# Patient Record
Sex: MALE | Race: WHITE | NOT HISPANIC OR LATINO | Employment: FULL TIME | URBAN - NONMETROPOLITAN AREA
[De-identification: names, ages, dates, MRNs, and addresses within clinical notes are randomized per-mention and may not be internally consistent; named-entity substitution may affect disease eponyms.]

---

## 2018-06-26 ENCOUNTER — TRANSCRIBE ORDERS (OUTPATIENT)
Dept: ADMINISTRATIVE | Facility: HOSPITAL | Age: 38
End: 2018-06-26

## 2018-06-26 ENCOUNTER — APPOINTMENT (OUTPATIENT)
Dept: LAB | Facility: HOSPITAL | Age: 38
End: 2018-06-26

## 2018-06-26 DIAGNOSIS — IMO0002 TYPE II DIABETES MELLITUS WITH COMA, UNCONTROLLED: Primary | ICD-10-CM

## 2018-06-26 LAB — HBA1C MFR BLD: 6.5 % (ref 3–6)

## 2018-06-26 PROCEDURE — 83036 HEMOGLOBIN GLYCOSYLATED A1C: CPT | Performed by: INTERNAL MEDICINE

## 2018-06-26 PROCEDURE — 36415 COLL VENOUS BLD VENIPUNCTURE: CPT | Performed by: INTERNAL MEDICINE

## 2018-11-16 ENCOUNTER — TRANSCRIBE ORDERS (OUTPATIENT)
Dept: ADMINISTRATIVE | Facility: HOSPITAL | Age: 38
End: 2018-11-16

## 2018-11-16 ENCOUNTER — LAB (OUTPATIENT)
Dept: LAB | Facility: HOSPITAL | Age: 38
End: 2018-11-16

## 2018-11-16 DIAGNOSIS — E11.8 TYPE 2 DIABETES MELLITUS WITH COMPLICATION, UNSPECIFIED WHETHER LONG TERM INSULIN USE: Primary | ICD-10-CM

## 2018-11-16 DIAGNOSIS — E11.8 TYPE 2 DIABETES MELLITUS WITH COMPLICATION, UNSPECIFIED WHETHER LONG TERM INSULIN USE: ICD-10-CM

## 2018-11-16 LAB
ANION GAP SERPL CALCULATED.3IONS-SCNC: 12.6 MMOL/L (ref 10–20)
BUN BLD-MCNC: 20 MG/DL (ref 7–20)
BUN/CREAT SERPL: 22.2 (ref 6.3–21.9)
CALCIUM SPEC-SCNC: 9.7 MG/DL (ref 8.4–10.2)
CHLORIDE SERPL-SCNC: 100 MMOL/L (ref 98–107)
CO2 SERPL-SCNC: 32 MMOL/L (ref 26–30)
CREAT BLD-MCNC: 0.9 MG/DL (ref 0.6–1.3)
GFR SERPL CREATININE-BSD FRML MDRD: 94 ML/MIN/1.73
GLUCOSE BLD-MCNC: 167 MG/DL (ref 74–98)
HBA1C MFR BLD: 6.2 % (ref 3–6)
POTASSIUM BLD-SCNC: 4.6 MMOL/L (ref 3.5–5.1)
SODIUM BLD-SCNC: 140 MMOL/L (ref 137–145)

## 2018-11-16 PROCEDURE — 83036 HEMOGLOBIN GLYCOSYLATED A1C: CPT

## 2018-11-16 PROCEDURE — 80048 BASIC METABOLIC PNL TOTAL CA: CPT

## 2018-11-16 PROCEDURE — 36415 COLL VENOUS BLD VENIPUNCTURE: CPT

## 2019-02-05 ENCOUNTER — OFFICE VISIT (OUTPATIENT)
Dept: UROLOGY | Facility: CLINIC | Age: 39
End: 2019-02-05

## 2019-02-05 VITALS
WEIGHT: 215 LBS | HEART RATE: 91 BPM | DIASTOLIC BLOOD PRESSURE: 80 MMHG | SYSTOLIC BLOOD PRESSURE: 100 MMHG | OXYGEN SATURATION: 97 % | BODY MASS INDEX: 28.49 KG/M2 | HEIGHT: 73 IN | TEMPERATURE: 97.5 F

## 2019-02-05 DIAGNOSIS — N52.9 ERECTILE DYSFUNCTION, UNSPECIFIED ERECTILE DYSFUNCTION TYPE: Primary | ICD-10-CM

## 2019-02-05 DIAGNOSIS — I10 HYPERTENSION, UNSPECIFIED TYPE: ICD-10-CM

## 2019-02-05 DIAGNOSIS — E11.8 TYPE 2 DIABETES MELLITUS WITH COMPLICATION, UNSPECIFIED WHETHER LONG TERM INSULIN USE: ICD-10-CM

## 2019-02-05 PROCEDURE — 99244 OFF/OP CNSLTJ NEW/EST MOD 40: CPT | Performed by: UROLOGY

## 2019-02-05 RX ORDER — LISINOPRIL 5 MG/1
5 TABLET ORAL DAILY
COMMUNITY
Start: 2019-01-08 | End: 2020-11-11 | Stop reason: SDUPTHER

## 2019-02-05 RX ORDER — FLUOXETINE HYDROCHLORIDE 40 MG/1
40 CAPSULE ORAL DAILY
COMMUNITY
Start: 2019-01-08

## 2019-02-05 RX ORDER — DULAGLUTIDE 1.5 MG/.5ML
INJECTION, SOLUTION SUBCUTANEOUS
COMMUNITY
Start: 2018-11-01 | End: 2019-12-13

## 2019-02-05 NOTE — PROGRESS NOTES
Chief Complaint  Erectile dysfunction    Referring Provider  Odalis Landin APRN HPI  Mr. Flores is a 38 y.o. male with history of T2DM, HTN who presents with erectile dysfunction. He used to weigh > 350 lbs and now weights 215.     He primarily has difficulty with achieving and maintaining an erection.    He has tried cialis in the past with unclear results.    Without medication, he rates his erectile rigidity as a 5 on a scale of 0-10 (with 5 being just firm enough for penetration).   SRINIVAS - 6    He thins he did take the oral PDE5- on an empty stomach, 1 hour prior to intercourse and without alcohol. He does not take oral nitrates for chest pain.     He denies any issues with energy or libido.   He denies anxiety about erections.    His diabetes is managed well with , metformin, Januvia, and trulicity  His hypertension is well managed with lisinopril      Past Medical History  Past Medical History:   Diagnosis Date   • Acid reflux    • Diabetes (CMS/HCC)    • HTN (hypertension)        Past Surgical History   has no past surgical history on file.    Medications  He has a current medication list which includes the following prescription(s): fluoxetine, lisinopril, metformin, sitagliptin, and trulicity.    Allergies  No Known Allergies    Social History  He  reports that  has never smoked. he has never used smokeless tobacco. He reports that he does not drink alcohol or use drugs.    Family History  He has no family history of prostate cancer.  There is a strong family history of cardiovascular disease.  He family history includes Heart disease in his maternal grandfather and paternal grandfather.    Review of Systems  A complete review of systems was performed and was unremarkable.  All other systems were otherwise negative.     Physical Exam  There were no vitals taken for this visit.  There is no height or weight on file to calculate BMI.  Constitutional: NAD, WDWN.   HEENT: NCAT. Conjunctivae normal.   MMM.    Cardiovascular: Regular rate.  Pulmonary/Chest: Respirations are even and non-labored bilaterally.  Abdominal: Soft. No distension, tenderness, masses or guarding. No CVA tenderness.  Neurological: A + O x 3.  Cranial Nerves II-XII grossly intact. Normal gait.  Extremities: DWAIN x 4, Warm. No clubbing.  No cyanosis.    Skin: Pink, warm and dry.  No rashes noted.    Genitourinary  Penis: circumcised penis, glans normal, no penile discharge.  No rashes/lesions.    Testes: Left: 30mL, Right: 30mL, no masses and normal consistency, nontender to palpation. Vasa palpable bilaterally.  No clinically palpable varicocele with valsalva. Remainder of scrotal contents normal    Labs  No results found for: TESTOSTERONE, PROLACTIN, FSH, LH, HCT     12/17/18  Total  T - 527  Free T -42    No results found for: PSA     Lab Results   Component Value Date    HGBA1C 6.2 (H) 11/16/2018       Assessment  Mr. Flores is a 38 y.o. male with erectile dysfunction.  Risk factors include DM, HTN.     Plan  1.  I have recommended to the patient he continue current healthy lifestyle patterns   2.  I discussed treatment options including oral PDE5- medication, intra-urethral suppositories, pharmacologic injections, vacuum erection devices and implantable penile prostheses.  He would like to try ICI. We will order him Trimix from the compounding pharmacy in [place] and he will return in 2 weeks for intracavernosal injection teaching.   3. He was instructed to go immediately to his nearest emergency room in the event he developed an erection lasting longer than 3 hours. He voiced understanding of all these instructions and the importance of seeking prompt medical attention for an erection lasting longer than 3 hours.   4. We also discussed association of erectile dysfunction and future coronary events. Erectile dysfunction can be a marker for future coronary events and usually precedes by 2-4 years.  Recommended he discuss EKG and  possible stress test with his PCP and following his cholesterol.

## 2019-02-25 ENCOUNTER — OFFICE VISIT (OUTPATIENT)
Dept: UROLOGY | Facility: CLINIC | Age: 39
End: 2019-02-25

## 2019-02-25 VITALS — HEIGHT: 73 IN | RESPIRATION RATE: 16 BRPM | BODY MASS INDEX: 28.49 KG/M2 | WEIGHT: 215 LBS

## 2019-02-25 DIAGNOSIS — N52.1 ERECTILE DYSFUNCTION DUE TO DISEASES CLASSIFIED ELSEWHERE: Primary | ICD-10-CM

## 2019-02-25 DIAGNOSIS — E11.8 TYPE 2 DIABETES MELLITUS WITH COMPLICATION, UNSPECIFIED WHETHER LONG TERM INSULIN USE: ICD-10-CM

## 2019-02-25 DIAGNOSIS — I10 HYPERTENSION, UNSPECIFIED TYPE: ICD-10-CM

## 2019-02-25 PROCEDURE — 99214 OFFICE O/P EST MOD 30 MIN: CPT | Performed by: UROLOGY

## 2019-02-25 NOTE — PROGRESS NOTES
"Chief Complaint  Erectile dysfunction    HPI  Mr. Flores is a 38 y.o. male with history of T2DM, HTN who presents with erectile dysfunction. He used to weigh > 350 lbs and now weights 215.     He presents today for intracavernosal injections and teaching.     To review,  He primarily has difficulty with achieving and maintaining an erection.  He has tried cialis in the past with unclear results.    Without medication, he rates his erectile rigidity as a 5 on a scale of 0-10 (with 5 being just firm enough for penetration).   SRINIVAS - 6  He thinks he did take the oral PDE5- on an empty stomach, 1 hour prior to intercourse and without alcohol. He does not take oral nitrates for chest pain.     He denies any issues with energy or libido.   He denies anxiety about erections.    His diabetes is managed well with , metformin, Januvia, and trulicity  His hypertension is well managed with lisinopril      Past Medical History  Past Medical History:   Diagnosis Date   • Acid reflux    • Diabetes (CMS/[facility])    • HTN (hypertension)        Past Surgical History   has no past surgical history on file.    Medications  He has a current medication list which includes the following prescription(s): fluoxetine, lisinopril, metformin, sitagliptin, and trulicity.    Allergies  No Known Allergies    Social History  He  reports that  has never smoked. he has never used smokeless tobacco. He reports that he does not drink alcohol or use drugs.    Family History  He has no family history of prostate cancer.  There is a strong family history of cardiovascular disease.  He family history includes Heart disease in his maternal grandfather and paternal grandfather.    Review of Systems  A complete review of systems was performed and was unremarkable.  All other systems were otherwise negative.     Physical Exam  Resp 16   Ht 185.4 cm (72.99\")   Wt 97.5 kg (215 lb)   BMI 28.37 kg/m²   Body mass index is 28.37 kg/m².  Constitutional: NAD, WDWN. "   HEENT: NCAT. Conjunctivae normal.  MMM.    Cardiovascular: Regular rate.  Pulmonary/Chest: Respirations are even and non-labored bilaterally.  Abdominal: Soft. No distension, tenderness, masses or guarding. No CVA tenderness.  Neurological: A + O x 3.  Cranial Nerves II-XII grossly intact. Normal gait.  Extremities: DWAIN x 4, Warm. No clubbing.  No cyanosis.    Skin: Pink, warm and dry.  No rashes noted.    Genitourinary  Penis: circumcised penis, glans normal, no penile discharge.  No rashes/lesions.    Testes: Left: 30mL, Right: 30mL, no masses and normal consistency, nontender to palpation. Vasa palpable bilaterally.  No clinically palpable varicocele with valsalva. Remainder of scrotal contents normal    Labs  No results found for: TESTOSTERONE, PROLACTIN, FSH, LH, HCT     12/17/18  Total  T - 527  Free T -42    No results found for: PSA     Lab Results   Component Value Date    HGBA1C 6.2 (H) 11/16/2018       Assessment  Mr. Flores is a 38 y.o. male with erectile dysfunction.  Risk factors include DM, HTN.     Today I titrated him up to 0.3 mL of Trimix.  He experienced only a 50% erection with this.  He seemed very nervous in the office and uncomfortable with the whole visit.  Therefore I told him to try injecting starting with 0.2 mL at home and we will see what his results are with this.  He did have an approximate 30 degree dorsolateral curvature to the right approximately 1-2 cm from the coronal sulcus.     I counseled him that he may be looking at needing a penile prosthesis in the future.  He would be a good candidate for this.    Plan  1.   He was instructed to go immediately to his nearest emergency room in the event he developed an erection lasting longer than 3 hours. He voiced understanding of all these instructions and the importance of seeking prompt medical attention for an erection lasting longer than 3 hours.   2.   Follow-up in 2 weeks      I spent a total of 30 minutes with the patient in  face-to-face interaction, with >50% of the time spent in engaging in counseling on the risks, benefits, and alternatives of the therapy and coordinating care.

## 2019-03-11 ENCOUNTER — OFFICE VISIT (OUTPATIENT)
Dept: UROLOGY | Facility: CLINIC | Age: 39
End: 2019-03-11

## 2019-03-11 VITALS — HEIGHT: 73 IN | WEIGHT: 215 LBS | RESPIRATION RATE: 16 BRPM | BODY MASS INDEX: 28.49 KG/M2

## 2019-03-11 DIAGNOSIS — N52.1 ERECTILE DYSFUNCTION DUE TO DISEASES CLASSIFIED ELSEWHERE: Primary | ICD-10-CM

## 2019-03-11 PROCEDURE — 99213 OFFICE O/P EST LOW 20 MIN: CPT | Performed by: UROLOGY

## 2019-03-11 NOTE — PROGRESS NOTES
Chief Complaint  Erectile dysfunction    HPI  Mr. Flores is a 38 y.o. male with history of T2DM, HTN who presents with erectile dysfunction. He used to weigh > 350 lbs and now weights 215.     He presents today for follow-up after his initial experience at home with intracavernosal injections.  He states that he has had excellent erections for penetration with 0.2 mL of the Trimix.  His erections did not last longer than an hour.    To review,  He primarily has difficulty with achieving and maintaining an erection.  He has tried cialis in the past with unclear results.    Without medication, he rates his erectile rigidity as a 5 on a scale of 0-10 (with 5 being just firm enough for penetration).   SRINIVAS - 6  He thinks he did take the oral PDE5- on an empty stomach, 1 hour prior to intercourse and without alcohol. He does not take oral nitrates for chest pain.     He denies any issues with energy or libido.   He denies anxiety about erections.    His diabetes is managed well with , metformin, Januvia, and trulicity  His hypertension is well managed with lisinopril      Past Medical History  Past Medical History:   Diagnosis Date   • Acid reflux    • Diabetes (CMS/[facility])    • HTN (hypertension)        Past Surgical History   has no past surgical history on file.    Medications  He has a current medication list which includes the following prescription(s): fluoxetine, lisinopril, metformin, sitagliptin, and trulicity.    Allergies  No Known Allergies    Social History  He  reports that  has never smoked. he has never used smokeless tobacco. He reports that he does not drink alcohol or use drugs.    Family History  He has no family history of prostate cancer.  There is a strong family history of cardiovascular disease.  He family history includes Heart disease in his maternal grandfather and paternal grandfather.    Review of Systems  A complete review of systems was performed and was unremarkable.  All other systems  "were otherwise negative.     Physical Exam  Resp 16   Ht 185.4 cm (72.99\")   Wt 97.5 kg (215 lb)   BMI 28.37 kg/m²   Body mass index is 28.37 kg/m².  Constitutional: NAD, WDWN.   HEENT: NCAT. Conjunctivae normal.  MMM.    Cardiovascular: Regular rate.  Pulmonary/Chest: Respirations are even and non-labored bilaterally.  Abdominal: Soft. No distension, tenderness, masses or guarding. No CVA tenderness.  Neurological: A + O x 3.  Cranial Nerves II-XII grossly intact. Normal gait.  Extremities: DWAIN x 4, Warm. No clubbing.  No cyanosis.    Skin: Pink, warm and dry.  No rashes noted.      Labs  No results found for: TESTOSTERONE, PROLACTIN, FSH, LH, HCT     12/17/18  Total  T - 527  Free T -42    No results found for: PSA     Lab Results   Component Value Date    HGBA1C 6.2 (H) 11/16/2018       Assessment  Mr. Flores is a 38 y.o. male with erectile dysfunction, who is currently using 0.2 mL of Trimix with excellent results.  Risk factors include DM, HTN.     I once again counseled him on calling our office or come into the ER for an erection lasting longer than 2 hours.  I counseled him that he may be looking at needing a penile prosthesis in the future.  He would be a good candidate for this.    Plan  1.   FU in 6 mo        "

## 2019-10-21 ENCOUNTER — TELEPHONE (OUTPATIENT)
Dept: INTERNAL MEDICINE | Facility: CLINIC | Age: 39
End: 2019-10-21

## 2019-12-13 ENCOUNTER — OFFICE VISIT (OUTPATIENT)
Dept: ENDOCRINOLOGY | Facility: CLINIC | Age: 39
End: 2019-12-13

## 2019-12-13 VITALS
SYSTOLIC BLOOD PRESSURE: 98 MMHG | HEIGHT: 73 IN | HEART RATE: 82 BPM | WEIGHT: 223 LBS | BODY MASS INDEX: 29.55 KG/M2 | OXYGEN SATURATION: 98 % | DIASTOLIC BLOOD PRESSURE: 70 MMHG

## 2019-12-13 DIAGNOSIS — E11.65 UNCONTROLLED TYPE 2 DIABETES MELLITUS WITH HYPERGLYCEMIA (HCC): Primary | ICD-10-CM

## 2019-12-13 PROBLEM — R73.09 ABNORMAL GLUCOSE: Status: ACTIVE | Noted: 2019-12-13

## 2019-12-13 LAB
GLUCOSE BLDC GLUCOMTR-MCNC: 442 MG/DL (ref 70–130)
HBA1C MFR BLD: 8.5 %

## 2019-12-13 PROCEDURE — 83036 HEMOGLOBIN GLYCOSYLATED A1C: CPT | Performed by: PHYSICIAN ASSISTANT

## 2019-12-13 PROCEDURE — 99215 OFFICE O/P EST HI 40 MIN: CPT | Performed by: PHYSICIAN ASSISTANT

## 2019-12-13 PROCEDURE — 82947 ASSAY GLUCOSE BLOOD QUANT: CPT | Performed by: PHYSICIAN ASSISTANT

## 2019-12-13 RX ORDER — SIMVASTATIN 40 MG
40 TABLET ORAL NIGHTLY
COMMUNITY
Start: 2019-11-05 | End: 2021-02-24 | Stop reason: SDUPTHER

## 2019-12-13 RX ORDER — OMEPRAZOLE 20 MG/1
20 CAPSULE, DELAYED RELEASE ORAL DAILY
COMMUNITY
Start: 2019-11-05

## 2019-12-13 RX ORDER — LINAGLIPTIN 5 MG/1
TABLET, FILM COATED ORAL
COMMUNITY
Start: 2019-11-05 | End: 2019-12-13 | Stop reason: ALTCHOICE

## 2019-12-13 NOTE — PATIENT INSTRUCTIONS
Stop Tradjenta  Start weekly injection Trulicity 0.75mg weekly.  Start Farxiga 10mg daily.   Continue metformin 1000mg twice a day.

## 2019-12-13 NOTE — PROGRESS NOTES
"Chief Complaint  Establish care for Diabetes Mellitus.     HPI   Flaco Flores is a 39 y.o. male who is here today for evaluation of Diabetes Mellitus type 2. The initial diagnosis of diabetes was made 2015.     during visit. Ate a bowl of cereal last night. Fasting right now.  Works swing shift. Needs to get A1C <8 for work.     A1C- 8.5 (12/13/19)  Labs reviewed:  10/17/19- AC ratio 4, , LDL 92, , TSH 2.68    Diabetic complications: mild peripheral neuropathy  Eye exam current (within one year): due  Foot care and dental care: discussed    Current diabetic medications include:  Metformin 1000mg BID  Tradjenta 5mg daily    Statin: zocor 40    Past medications: Januvia (insurance did not cover)    Diabetic Monitoring  - no meter or log for review. His blood sugar is checked at work, he reports readings 170-mid 200s    Nutrition:     Current diet: in general, an \"unhealthy\" diet, on average, 2-3 meals per day, no sugared drinks  Current exercise: none    The following portions of the patient's history were reviewed and updated by me as appropriate: allergies, current medications, past family history, past social history, past surgical history and problem list.    Past Medical History:   Diagnosis Date   • Acid reflux    • Diabetes (CMS/HCC)    • HTN (hypertension)        Medications    Current Outpatient Medications:   •  FLUoxetine (PROzac) 40 MG capsule, , Disp: , Rfl:   •  lisinopril (PRINIVIL,ZESTRIL) 5 MG tablet, , Disp: , Rfl:   •  metFORMIN (GLUCOPHAGE) 1000 MG tablet, Take 1 tablet by mouth 2 (Two) Times a Day., Disp: 60 tablet, Rfl: 6  •  omeprazole (priLOSEC) 20 MG capsule, , Disp: , Rfl:   •  simvastatin (ZOCOR) 40 MG tablet, , Disp: , Rfl:   •  Dapagliflozin Propanediol (FARXIGA) 10 MG tablet, Take 10 mg by mouth Daily., Disp: 30 tablet, Rfl: 6  •  Dulaglutide (TRULICITY) 0.75 MG/0.5ML solution pen-injector, Inject 0.75 mg under the skin into the appropriate area as directed 1 " "(One) Time Per Week., Disp: 4 pen, Rfl: 6    Review of Systems  Review of Systems   Constitutional: Negative for fatigue.   Cardiovascular: Negative for chest pain.   Endocrine: Negative for polydipsia, polyphagia and polyuria.   Skin: Negative for pallor.   Neurological: Positive for dizziness and headaches. Negative for tremors, seizures, speech difficulty and weakness.   Psychiatric/Behavioral: Negative for confusion. The patient is not nervous/anxious.         Physical Exam    BP 98/70   Pulse 82   Ht 185.4 cm (72.99\")   Wt 101 kg (223 lb)   SpO2 98%   BMI 29.43 kg/m² Body mass index is 29.43 kg/m².  Physical Exam   Constitutional: He is oriented to person, place, and time. He appears well-developed. No distress.   HENT:   Head: Normocephalic.   Right Ear: External ear normal.   Left Ear: External ear normal.   Nose: Nose normal.   Eyes: Conjunctivae are normal. Right eye exhibits no discharge. Left eye exhibits no discharge. No scleral icterus.   Neck: Neck supple. No JVD present. No tracheal deviation present. No thyromegaly present.   Cardiovascular: Normal rate, regular rhythm, normal heart sounds and intact distal pulses.   No murmur heard.  Pulmonary/Chest: Effort normal and breath sounds normal. No respiratory distress. He has no wheezes.   Abdominal: Soft. Bowel sounds are normal. There is no tenderness.   Musculoskeletal: He exhibits no edema or tenderness.    Flaco had a diabetic foot exam performed today.   During the foot exam he had a monofilament test performed.    Neurological Sensory Findings -  Altered sharp/dull right ankle/foot discrimination and altered sharp/dull left ankle/foot discrimination.  Vascular Status -  His right foot exhibits normal foot vasculature  and no edema. His left foot exhibits normal foot vasculature  and no edema.  Skin Integrity  -  His right foot skin is intact.  He has no right foot onychomycosis, no right foot ulcer and non-callous right foot.His left foot " skin is intact. He has no left foot onychomycosis, no left foot ulcer and non-callous left foot..  Neurological: He is alert and oriented to person, place, and time.   Skin: Skin is warm and dry. No rash noted. He is not diaphoretic. No erythema.   Psychiatric: He has a normal mood and affect. His behavior is normal. Judgment and thought content normal.       Labs and Imaging   Lab Results   Component Value Date    HGBA1C 8.5 12/13/2019    HGBA1C 6.2 (H) 11/16/2018    HGBA1C 6.5 (H) 06/26/2018     Office Visit on 12/13/2019   Component Date Value Ref Range Status   • Glucose 12/13/2019 442* 70 - 130 mg/dL Final   • Hemoglobin A1C 12/13/2019 8.5  % Final       Assessment / Plan   Flaco was seen today for diabetes.    Diagnoses and all orders for this visit:    Uncontrolled type 2 diabetes mellitus with hyperglycemia (CMS/[facility])  -     POC Glucose Fingerstick  -     POC Glycosylated Hemoglobin (Hb A1C)  -     Dulaglutide (TRULICITY) 0.75 MG/0.5ML solution pen-injector; Inject 0.75 mg under the skin into the appropriate area as directed 1 (One) Time Per Week.  -     Dapagliflozin Propanediol (FARXIGA) 10 MG tablet; Take 10 mg by mouth Daily.  -     metFORMIN (GLUCOPHAGE) 1000 MG tablet; Take 1 tablet by mouth 2 (Two) Times a Day.        Diabetes Mellitus 2 is under poor control.  -with peripheral neuropathy  -A1c 8.5, goal <7  -needs to get A1C <8 for his job  -change tradjenta to trulicity 0.75mg sc weekly. Samples and copay card provided. administration reviewed. No hx of pancreatitis. No personal or fam hx of thyroid ca or MEN2.  -start farxiga 10mg daily. 30-day-free coupon and copay card provided. Discussed importance of adequate hydration and good hygiene with this medication.   -continue metformin 1000mg BID  -check sugar fasting and hs, bring meter/log to all visits      1.  Diet: 3-4 carb servings per meal for females, 4-5 carb servings per meal for males  Spread carb intake throughout the day  Increase lean  protein and vegetable intake  Avoid sugary drinks and processed carbs including crackers, cookies, cakes  2.  Exercise: Recommend at least 30 minutes of exercise daily, at least 5 days per week. Increase exercise gradually.   3.  Blood Glucose Goal: Blood glucose goal <150 fasting, <180 2 hr postprandial  4.  Microalbumin due 10/2020  5.  Education performed during this visit: long term diabetic complications discussed. , annual eye examinations at Ophthalmology discussed, dental hygiene discussed  and foot care reviewed., home glucose monitoring emphasized, all medications, side effects and compliance discussed carefully and Hypoglycemia management and prevention reviewed. Reviewed ‘ABCs’ of diabetes management (respective goals in parentheses):  A1C (<7), blood pressure (<130/80), and cholesterol (LDL <100, if CVD <70).    Patient Instructions   Stop Tradjenta  Start weekly injection Trulicity 0.75mg weekly.  Start Farxiga 10mg daily.   Continue metformin 1000mg twice a day.       Follow up: Return in about 3 months (around 3/13/2020).    Discussed the nature of the disease including, risks, complications, implications, management, safe and proper use of medications. Encouraged therapeutic lifestyle changes including low calorie diet with plenty of fruits and vegetables, daily exercise, medication compliance, and keeping scheduled follow up appointments with me and any other providers. Encouraged patient to have appointment for complete physical, fasting labs, appropriate screenings, and immunizations on an annual basis.    25 min  of 45 min face-to-face visit time spent for coordination of care and counselling regarding identified problems as outlined in the objective, assessment and discussion portions of the documentation.    Signed: Myron Bourne PA-C

## 2019-12-17 ENCOUNTER — TELEPHONE (OUTPATIENT)
Dept: INTERNAL MEDICINE | Facility: CLINIC | Age: 39
End: 2019-12-17

## 2019-12-17 NOTE — TELEPHONE ENCOUNTER
FARXIGA IS NOT COVERED BY PATIENTS INSURANCE BUT INVOKANA AND JARDIANCE IS. PLEASE CONTACT HIS WIFE -[phone]

## 2020-05-08 ENCOUNTER — OFFICE VISIT (OUTPATIENT)
Dept: ENDOCRINOLOGY | Facility: CLINIC | Age: 40
End: 2020-05-08

## 2020-05-08 VITALS
WEIGHT: 218.6 LBS | SYSTOLIC BLOOD PRESSURE: 112 MMHG | OXYGEN SATURATION: 98 % | BODY MASS INDEX: 28.97 KG/M2 | HEART RATE: 93 BPM | HEIGHT: 73 IN | DIASTOLIC BLOOD PRESSURE: 60 MMHG

## 2020-05-08 DIAGNOSIS — I10 ESSENTIAL HYPERTENSION: ICD-10-CM

## 2020-05-08 DIAGNOSIS — E11.65 UNCONTROLLED TYPE 2 DIABETES MELLITUS WITH HYPERGLYCEMIA (HCC): Primary | ICD-10-CM

## 2020-05-08 DIAGNOSIS — E01.0 THYROMEGALY: ICD-10-CM

## 2020-05-08 LAB
GLUCOSE BLDC GLUCOMTR-MCNC: 149 MG/DL (ref 70–130)
HBA1C MFR BLD: 7.8 %

## 2020-05-08 PROCEDURE — 82947 ASSAY GLUCOSE BLOOD QUANT: CPT | Performed by: PHYSICIAN ASSISTANT

## 2020-05-08 PROCEDURE — 99214 OFFICE O/P EST MOD 30 MIN: CPT | Performed by: PHYSICIAN ASSISTANT

## 2020-05-08 PROCEDURE — 83036 HEMOGLOBIN GLYCOSYLATED A1C: CPT | Performed by: PHYSICIAN ASSISTANT

## 2020-05-08 RX ORDER — GLIMEPIRIDE 4 MG/1
TABLET ORAL
Qty: 30 TABLET | Refills: 6 | Status: SHIPPED | OUTPATIENT
Start: 2020-05-08 | End: 2020-08-11 | Stop reason: SDUPTHER

## 2020-05-08 NOTE — PROGRESS NOTES
"Chief Complaint  F/u for Diabetes Mellitus.     HPI   Flaco Flores is a 39 y.o. male who is here today for f/u of Diabetes Mellitus type 2. The initial diagnosis of diabetes was made 2015.    Admits to poor diet.     A1C- 7.8 (5/8/2020), 8.5 (12/13/19)    Labs reviewed:  10/17/19- AC ratio 4, , LDL 92, , TSH 2.68    Diabetic complications: mild peripheral neuropathy  Eye exam current (within one year): due  Foot care and dental care: discussed    Current diabetic medications include:  Metformin 1000mg BID  Jardiance 25mg daily  Trulicity 0.75mg sc weekly - mild nausea, but tolerable     Statin: zocor 40    Past medications: Januvia (insurance did not cover), tradjenta    Diabetic Monitoring  - no meter or log for review. His blood sugar is checked at work, he reports readings 170-mid 200s    Nutrition:     Current diet: in general, an \"unhealthy\" diet, on average, 2-3 meals per day, no sugared drinks  Current exercise: none    The following portions of the patient's history were reviewed and updated by me as appropriate: allergies, current medications, past family history, past social history, past surgical history and problem list.      Past Medical History:   Diagnosis Date   • Acid reflux    • Diabetes (CMS/HCC)    • HTN (hypertension)        Medications    Current Outpatient Medications:   •  Dulaglutide (Trulicity) 0.75 MG/0.5ML solution pen-injector, Inject 0.75 mg under the skin into the appropriate area as directed 1 (One) Time Per Week., Disp: 4 pen, Rfl: 6  •  Empagliflozin (Jardiance) 25 MG tablet, Take 25 mg by mouth Daily., Disp: 30 tablet, Rfl: 6  •  FLUoxetine (PROzac) 40 MG capsule, , Disp: , Rfl:   •  lisinopril (PRINIVIL,ZESTRIL) 5 MG tablet, , Disp: , Rfl:   •  metFORMIN (GLUCOPHAGE) 1000 MG tablet, Take 1 tablet by mouth 2 (Two) Times a Day., Disp: 60 tablet, Rfl: 6  •  omeprazole (priLOSEC) 20 MG capsule, , Disp: , Rfl:   •  simvastatin (ZOCOR) 40 MG tablet, , Disp: , Rfl: " "  •  glimepiride (Amaryl) 4 MG tablet, Take 4mg before biggest meal daily, Disp: 30 tablet, Rfl: 6    Review of Systems  Review of Systems   Constitutional: Negative for fatigue.   Cardiovascular: Negative for chest pain.   Endocrine: Negative for polydipsia, polyphagia and polyuria.   Skin: Negative for pallor.   Neurological: Positive for dizziness and headaches. Negative for tremors, seizures, speech difficulty and weakness.   Psychiatric/Behavioral: Negative for confusion. The patient is not nervous/anxious.         Physical Exam    /60 (BP Location: Left arm, Patient Position: Sitting, Cuff Size: Adult)   Pulse 93   Ht 185.4 cm (73\")   Wt 99.2 kg (218 lb 9.6 oz)   SpO2 98%   BMI 28.84 kg/m² Body mass index is 28.84 kg/m².  Physical Exam   Constitutional: He is oriented to person, place, and time. He appears well-developed. No distress.   HENT:   Head: Normocephalic.   Right Ear: External ear normal.   Left Ear: External ear normal.   Nose: Nose normal.   Eyes: Conjunctivae are normal. Right eye exhibits no discharge. Left eye exhibits no discharge. No scleral icterus.   Neck: Neck supple. No JVD present. No tracheal deviation present. Thyromegaly present.   Cardiovascular: Normal rate, regular rhythm, normal heart sounds and intact distal pulses.   No murmur heard.  Pulmonary/Chest: Effort normal and breath sounds normal. No respiratory distress. He has no wheezes.   Abdominal: Soft. Bowel sounds are normal. There is no tenderness.   Musculoskeletal: He exhibits no edema or tenderness.   Neurological: He is alert and oriented to person, place, and time.   Skin: Skin is warm and dry. No rash noted. He is not diaphoretic. No erythema.   Psychiatric: He has a normal mood and affect. His behavior is normal. Judgment and thought content normal.       Labs and Imaging   Lab Results   Component Value Date    HGBA1C 7.8 05/08/2020    HGBA1C 8.5 12/13/2019    HGBA1C 6.2 (H) 11/16/2018     Office Visit on " 05/08/2020   Component Date Value Ref Range Status   • Glucose 05/08/2020 149* 70 - 130 mg/dL Final   • Hemoglobin A1C 05/08/2020 7.8  % Final       Assessment / Plan   Flaco was seen today for diabetes.    Diagnoses and all orders for this visit:    Uncontrolled type 2 diabetes mellitus with hyperglycemia (CMS/[facility])  -     POC Glucose  -     POC Glycosylated Hemoglobin (Hb A1C)  -     glimepiride (Amaryl) 4 MG tablet; Take 4mg before biggest meal daily  -     metFORMIN (GLUCOPHAGE) 1000 MG tablet; Take 1 tablet by mouth 2 (Two) Times a Day.  -     Empagliflozin (Jardiance) 25 MG tablet; Take 25 mg by mouth Daily.  -     Dulaglutide (Trulicity) 0.75 MG/0.5ML solution pen-injector; Inject 0.75 mg under the skin into the appropriate area as directed 1 (One) Time Per Week.    Thyromegaly  -     US Thyroid; Future    Essential hypertension        Diabetes Mellitus 2 is under poor control.  -with peripheral neuropathy  -A1c 7.8, down from 8.5 12/2019, goal <7  -needs to get A1C <8 for his job  -add glimepiride 4mg qd ac. Emphasis on taking AC to avoid hypoglycemia  -trulicity 0.75mg sc weekly  -continue Jardiance 25mg QD  -continue metformin 1000mg BID  -check sugar fasting and hs, bring meter/log to all visits      1.  Diet: 3-4 carb servings per meal for females, 4-5 carb servings per meal for males  Spread carb intake throughout the day  Increase lean protein and vegetable intake  Avoid sugary drinks and processed carbs including crackers, cookies, cakes  2.  Exercise: Recommend at least 30 minutes of exercise daily, at least 5 days per week. Increase exercise gradually.   3.  Blood Glucose Goal: Blood glucose goal <150 fasting, <180 2 hr postprandial  4.  Microalbumin due 10/2020  5.  Education performed during this visit: long term diabetic complications discussed. , annual eye examinations at Ophthalmology discussed, dental hygiene discussed  and foot care reviewed., home glucose monitoring emphasized, all  medications, side effects and compliance discussed carefully and Hypoglycemia management and prevention reviewed. Reviewed ‘ABCs’ of diabetes management (respective goals in parentheses):  A1C (<7), blood pressure (<130/80), and cholesterol (LDL <100, if CVD <70).    Thyromegaly   -thyroid u/s    HTN  -controlled  -continue lisinopril    There are no Patient Instructions on file for this visit.    Follow up: Return in about 3 months (around 8/8/2020).    Discussed the nature of the disease including, risks, complications, implications, management, safe and proper use of medications. Encouraged therapeutic lifestyle changes including low calorie diet with plenty of fruits and vegetables, daily exercise, medication compliance, and keeping scheduled follow up appointments with me and any other providers. Encouraged patient to have appointment for complete physical, fasting labs, appropriate screenings, and immunizations on an annual basis.      Signed: Myron Bourne PA-C

## 2020-05-13 ENCOUNTER — DOCUMENTATION (OUTPATIENT)
Dept: ENDOCRINOLOGY | Facility: CLINIC | Age: 40
End: 2020-05-13

## 2020-05-13 NOTE — PROGRESS NOTES
Per Jayshree Fraire regarding schedule thyroid us:  Central scheduling has attempted to contact the patient 3x to schedule. I left them a voice message as well and will mail them a letter with the number for them to contact to schedule.

## 2020-05-20 ENCOUNTER — HOSPITAL ENCOUNTER (OUTPATIENT)
Dept: ULTRASOUND IMAGING | Facility: HOSPITAL | Age: 40
Discharge: HOME | End: 2020-05-20
Admitting: PHYSICIAN ASSISTANT

## 2020-05-20 DIAGNOSIS — E01.0 THYROMEGALY: ICD-10-CM

## 2020-05-20 PROCEDURE — 76536 US EXAM OF HEAD AND NECK: CPT

## 2020-05-22 ENCOUNTER — TELEPHONE (OUTPATIENT)
Dept: ENDOCRINOLOGY | Facility: CLINIC | Age: 40
End: 2020-05-22

## 2020-05-22 NOTE — TELEPHONE ENCOUNTER
----- Message from Myron Bourne PA-C sent at 5/21/2020  4:20 PM EDT -----  Please call pt.  Thyroid u/s shows a nodule on the right lobe. It has smooth borders which is good, but it is large enough to possibly need a bx. Thyroid nodules are very common and generally benign but just because of the size would like to follow up on it.   Will add to Dr. Marcial's schedule in the next few months for repeat u/s +/- bx.    Jackie,  Please call pt and schedule with dr. Marcial in the next few months. He will need to be in a new pt slot with u/s (since he will be new to her)

## 2020-05-28 ENCOUNTER — RESULTS ENCOUNTER (OUTPATIENT)
Dept: UROLOGY | Facility: CLINIC | Age: 40
End: 2020-05-28

## 2020-05-28 ENCOUNTER — OFFICE VISIT (OUTPATIENT)
Dept: UROLOGY | Facility: CLINIC | Age: 40
End: 2020-05-28

## 2020-05-28 VITALS
OXYGEN SATURATION: 98 % | HEIGHT: 73 IN | TEMPERATURE: 98 F | RESPIRATION RATE: 18 BRPM | WEIGHT: 218 LBS | BODY MASS INDEX: 28.89 KG/M2 | HEART RATE: 74 BPM

## 2020-05-28 DIAGNOSIS — N52.9 ERECTILE DYSFUNCTION, UNSPECIFIED ERECTILE DYSFUNCTION TYPE: Primary | ICD-10-CM

## 2020-05-28 DIAGNOSIS — N52.9 ERECTILE DYSFUNCTION, UNSPECIFIED ERECTILE DYSFUNCTION TYPE: ICD-10-CM

## 2020-05-28 PROCEDURE — 99214 OFFICE O/P EST MOD 30 MIN: CPT | Performed by: UROLOGY

## 2020-05-28 RX ORDER — SULFAMETHOXAZOLE AND TRIMETHOPRIM 800; 160 MG/1; MG/1
1 TABLET ORAL DAILY
Qty: 5 TABLET | Refills: 0 | Status: SHIPPED | OUTPATIENT
Start: 2020-05-28 | End: 2020-07-29 | Stop reason: HOSPADM

## 2020-05-28 RX ORDER — SODIUM CHLORIDE 9 MG/ML
100 INJECTION, SOLUTION INTRAVENOUS CONTINUOUS
Status: CANCELLED | OUTPATIENT
Start: 2020-05-28

## 2020-05-28 RX ORDER — FLUCONAZOLE 100 MG/1
100 TABLET ORAL DAILY
Qty: 5 TABLET | Refills: 0 | Status: SHIPPED | OUTPATIENT
Start: 2020-05-28 | End: 2020-06-02

## 2020-05-28 RX ORDER — GABAPENTIN 300 MG/1
300 CAPSULE ORAL 3 TIMES DAILY
Qty: 30 CAPSULE | Refills: 0 | Status: SHIPPED | OUTPATIENT
Start: 2020-05-28 | End: 2020-08-11

## 2020-05-28 NOTE — PROGRESS NOTES
Chief Complaint  Erectile dysfunction    HPI  Mr. Flores is a 39 y.o. male with history of T2DM, HTN who presents with erectile dysfunction. He used to weigh > 350 lbs and now weights 215.     He presents today for follow-up after his initial experience at home with intracavernosal injections.  He states that initially he had excellent erections for penetration with 0.2 mL of the Trimix, now it is not working as well and he would like to discuss penile prosthesis.  His erections did not last longer than an hour.      To review,  He primarily has difficulty with achieving and maintaining an erection.  He has tried cialis in the past with unclear results.  Without medication, he rates his erectile rigidity as a 5 on a scale of 0-10 (with 5 being just firm enough for penetration).   SRINIVAS - 6  He thinks he did take the oral PDE5- on an empty stomach, 1 hour prior to intercourse and without alcohol. He does not take oral nitrates for chest pain.   He denies any issues with energy or libido.   He denies anxiety about erections.  His diabetes is managed well with , metformin, Januvia, and trulicity  His hypertension is well managed with lisinopril      Past Medical History  Past Medical History:   Diagnosis Date   • Acid reflux    • Diabetes (CMS/HCC)    • HTN (hypertension)        Past Surgical History   has no past surgical history on file.    Medications  He has a current medication list which includes the following prescription(s): dulaglutide, empagliflozin, fluoxetine, glimepiride, lisinopril, metformin, omeprazole, and simvastatin.    Allergies  No Known Allergies    Social History  He  reports that he has never smoked. He has never used smokeless tobacco. He reports that he drinks alcohol. He reports that he does not use drugs.    Family History  He has no family history of prostate cancer.  There is a strong family history of cardiovascular disease.  He family history includes Heart disease in his maternal  "grandfather and paternal grandfather.    Review of Systems  A complete review of systems was performed and was unremarkable.  All other systems were otherwise negative.     Physical Exam  Pulse 74   Temp 98 °F (36.7 °C)   Resp 18   Ht 185.4 cm (72.99\")   Wt 98.9 kg (218 lb)   SpO2 98%   BMI 28.77 kg/m²   Body mass index is 28.77 kg/m².  Constitutional: NAD, WDWN.   HEENT: NCAT. Conjunctivae normal.  MMM.    Cardiovascular: Regular rate.  Pulmonary/Chest: Respirations are even and non-labored bilaterally.  Abdominal: Soft. No distension, tenderness, masses or guarding. No CVA tenderness.  Neurological: A + O x 3.  Cranial Nerves II-XII grossly intact. Normal gait.  Extremities: DWAIN x 4, Warm. No clubbing.  No cyanosis.    Skin: Pink, warm and dry.  No rashes noted.      Labs  No results found for: TESTOSTERONE, PROLACTIN, FSH, LH, HCT     12/17/18  Total  T - 527  Free T -42    No results found for: PSA     Lab Results   Component Value Date    HGBA1C 7.8 05/08/2020       Assessment  Mr. Flores is a 39 y.o. male with erectile dysfunction, who is currently using 0.2 mL of Trimix with worsening results.  Risk factors include DM, HTN.     We discussed the risk, benefits, and alternatives to penile prosthesis.  He voices understanding and wished to proceed.  I did tell him that with his history of diabetes mellitus which is not completely well controlled, though his A1c is acceptable, he is somewhat higher than the 2% chance of infection.  He says that he understands this.  We also discussed other risks such as bleeding, perforation, damage to nearby structures, need for future revisions during his long lifetime.    Plan  1.   Schedule for IPP on 6/23/2020.  PAT and cover testing the Thursday before on the 18th.  2.  Fluconazole, Bactrim, and gabapentin to start 5 days before surgery        "

## 2020-05-29 ENCOUNTER — TELEPHONE (OUTPATIENT)
Dept: ENDOCRINOLOGY | Facility: CLINIC | Age: 40
End: 2020-05-29

## 2020-05-29 NOTE — TELEPHONE ENCOUNTER
Pt is going to be having surgery on 6/23 with his urologist -they will be working on his private area they told the pt to check with Endo .  The pt needs to know if he should continue taking the jardiance before and after his surgery    Please call  [phone]

## 2020-06-02 ENCOUNTER — RESULTS ENCOUNTER (OUTPATIENT)
Dept: UROLOGY | Facility: CLINIC | Age: 40
End: 2020-06-02

## 2020-06-02 DIAGNOSIS — N52.9 ERECTILE DYSFUNCTION, UNSPECIFIED ERECTILE DYSFUNCTION TYPE: ICD-10-CM

## 2020-06-18 ENCOUNTER — APPOINTMENT (OUTPATIENT)
Dept: PREADMISSION TESTING | Facility: HOSPITAL | Age: 40
End: 2020-06-18

## 2020-06-18 VITALS
BODY MASS INDEX: 28.14 KG/M2 | HEART RATE: 98 BPM | DIASTOLIC BLOOD PRESSURE: 75 MMHG | WEIGHT: 219.25 LBS | HEIGHT: 74 IN | SYSTOLIC BLOOD PRESSURE: 114 MMHG | OXYGEN SATURATION: 99 %

## 2020-06-18 LAB
ANION GAP SERPL CALCULATED.3IONS-SCNC: 8.3 MMOL/L (ref 5–15)
BILIRUB UR QL STRIP: NEGATIVE
BUN BLD-MCNC: 18 MG/DL (ref 6–20)
BUN/CREAT SERPL: 18.2 (ref 7–25)
CALCIUM SPEC-SCNC: 9.7 MG/DL (ref 8.6–10.5)
CHLORIDE SERPL-SCNC: 102 MMOL/L (ref 98–107)
CLARITY UR: CLEAR
CO2 SERPL-SCNC: 28.7 MMOL/L (ref 22–29)
COLOR UR: YELLOW
CREAT BLD-MCNC: 0.99 MG/DL (ref 0.76–1.27)
DEPRECATED RDW RBC AUTO: 38.4 FL (ref 37–54)
ERYTHROCYTE [DISTWIDTH] IN BLOOD BY AUTOMATED COUNT: 12.8 % (ref 12.3–15.4)
GFR SERPL CREATININE-BSD FRML MDRD: 84 ML/MIN/1.73
GLUCOSE BLD-MCNC: 90 MG/DL (ref 65–99)
GLUCOSE UR STRIP-MCNC: ABNORMAL MG/DL
HBA1C MFR BLD: 7.8 % (ref 4.8–5.6)
HCT VFR BLD AUTO: 47.7 % (ref 37.5–51)
HGB BLD-MCNC: 16.3 G/DL (ref 13–17.7)
HGB UR QL STRIP.AUTO: NEGATIVE
KETONES UR QL STRIP: NEGATIVE
LEUKOCYTE ESTERASE UR QL STRIP.AUTO: NEGATIVE
MCH RBC QN AUTO: 28.6 PG (ref 26.6–33)
MCHC RBC AUTO-ENTMCNC: 34.2 G/DL (ref 31.5–35.7)
MCV RBC AUTO: 83.8 FL (ref 79–97)
NITRITE UR QL STRIP: NEGATIVE
PH UR STRIP.AUTO: 8.5 [PH] (ref 5–8)
PLATELET # BLD AUTO: 208 10*3/MM3 (ref 140–450)
PMV BLD AUTO: 10.1 FL (ref 6–12)
POTASSIUM BLD-SCNC: 4.1 MMOL/L (ref 3.5–5.2)
PROT UR QL STRIP: NEGATIVE
RBC # BLD AUTO: 5.69 10*6/MM3 (ref 4.14–5.8)
SODIUM BLD-SCNC: 139 MMOL/L (ref 136–145)
SP GR UR STRIP: >=1.03 (ref 1–1.03)
UROBILINOGEN UR QL STRIP: ABNORMAL
WBC NRBC COR # BLD: 7.31 10*3/MM3 (ref 3.4–10.8)

## 2020-06-18 PROCEDURE — 93005 ELECTROCARDIOGRAM TRACING: CPT

## 2020-06-18 PROCEDURE — 83036 HEMOGLOBIN GLYCOSYLATED A1C: CPT | Performed by: UROLOGY

## 2020-06-18 PROCEDURE — 80048 BASIC METABOLIC PNL TOTAL CA: CPT | Performed by: UROLOGY

## 2020-06-18 PROCEDURE — 85027 COMPLETE CBC AUTOMATED: CPT | Performed by: UROLOGY

## 2020-06-18 PROCEDURE — 81003 URINALYSIS AUTO W/O SCOPE: CPT | Performed by: UROLOGY

## 2020-06-18 PROCEDURE — 36415 COLL VENOUS BLD VENIPUNCTURE: CPT | Performed by: UROLOGY

## 2020-06-18 NOTE — DISCHARGE INSTRUCTIONS
PAT PASS GIVEN/REVIEWED WITH PT.  VERBALIZED UNDERSTANDING OF THE FOLLOWING:  DO NOT EAT, DRINK, SMOKE, USE SMOKELESS TOBACCO OR CHEW GUM AFTER MIDNIGHT THE NIGHT BEFORE SURGERY.  THIS ALSO INCLUDES HARD CANDIES AND MINTS.    DO NOT SHAVE THE AREA TO BE OPERATED ON AT LEAST 48 HOURS PRIOR TO THE PROCEDURE.  DO NOT WEAR MAKE UP OR NAIL POLISH.  DO NOT LEAVE IN ANY PIERCING OR WEAR JEWELRY THE DAY OF SURGERY.      DO NOT USE ADHESIVES IF YOU WEAR DENTURES.    DO NOT WEAR EYE CONTACTS; BRING IN YOUR GLASSES.    ONLY TAKE MEDICATION THE MORNING OF YOUR PROCEDURE IF INSTRUCTED BY YOUR SURGEON WITH ENOUGH WATER TO SWALLOW THE MEDICATION.  IF YOUR SURGEON DID NOT SPECIFY WHICH MEDICATIONS TO TAKE, YOU WILL NEED TO CALL THEIR OFFICE FOR FURTHER INSTRUCTIONS AND DO AS THEY INSTRUCT.    LEAVE ANYTHING YOU CONSIDER VALUABLE AT HOME.    YOU WILL NEED TO ARRANGE FOR SOMEONE TO DRIVE YOU HOME AFTER SURGERY.  IT IS RECOMMENDED THAT YOU DO NOT DRIVE, WORK, DRINK ALCOHOL OR MAKE MAJOR DECISIONS FOR AT LEAST 24 HOURS AFTER YOUR PROCEDURE IS COMPLETE.      THE DAY OF YOUR PROCEDURE, BRING IN THE FOLLOWING IF APPLICABLE:   PICTURE ID AND INSURANCE/MEDICARE OR MEDICAID CARDS/ANY CO-PAY THAT MAY BE DUE   COPY OF ADVANCED DIRECTIVE/LIVING WILL/POWER OR    CPAP/BIPAP/INHALERS   SKIN PREP SHEET   YOUR PREADMISSION TESTING PASS (IF NOT A PHONE HISTORY)        Chlorhexidine wipes along with instruction/verification sheet given to pt.  Instructed pt to apply stickers from chlorhexidine wipes to verification sheet once skin prep has been  completed, and to return to [facility] the day of the procedure.  Pt. Verbalizes understanding.      PAT patient education COVID testing pre-op instructions reviewed with pt.  Verbalized understanding.

## 2020-06-19 ENCOUNTER — LAB (OUTPATIENT)
Dept: LAB | Facility: HOSPITAL | Age: 40
End: 2020-06-19

## 2020-06-19 DIAGNOSIS — N52.9 ERECTILE DYSFUNCTION, UNSPECIFIED ERECTILE DYSFUNCTION TYPE: ICD-10-CM

## 2020-06-19 PROCEDURE — U0004 COV-19 TEST NON-CDC HGH THRU: HCPCS

## 2020-06-19 PROCEDURE — U0002 COVID-19 LAB TEST NON-CDC: HCPCS

## 2020-06-19 PROCEDURE — C9803 HOPD COVID-19 SPEC COLLECT: HCPCS

## 2020-06-20 LAB
REF LAB TEST METHOD: NORMAL
SARS-COV-2 RNA RESP QL NAA+PROBE: NOT DETECTED

## 2020-06-23 ENCOUNTER — ANESTHESIA EVENT (OUTPATIENT)
Dept: PERIOP | Facility: HOSPITAL | Age: 40
End: 2020-06-23

## 2020-06-23 ENCOUNTER — ANESTHESIA (OUTPATIENT)
Dept: PERIOP | Facility: HOSPITAL | Age: 40
End: 2020-06-23

## 2020-06-23 ENCOUNTER — HOSPITAL ENCOUNTER (OUTPATIENT)
Facility: HOSPITAL | Age: 40
Setting detail: HOSPITAL OUTPATIENT SURGERY
Discharge: HOME | End: 2020-06-23
Attending: UROLOGY | Admitting: UROLOGY

## 2020-06-23 VITALS
HEART RATE: 86 BPM | TEMPERATURE: 97.3 F | OXYGEN SATURATION: 96 % | RESPIRATION RATE: 16 BRPM | SYSTOLIC BLOOD PRESSURE: 101 MMHG | DIASTOLIC BLOOD PRESSURE: 66 MMHG

## 2020-06-23 DIAGNOSIS — N52.9 ERECTILE DYSFUNCTION, UNSPECIFIED ERECTILE DYSFUNCTION TYPE: ICD-10-CM

## 2020-06-23 DIAGNOSIS — N52.9 ERECTILE DYSFUNCTION, UNSPECIFIED ERECTILE DYSFUNCTION TYPE: Primary | ICD-10-CM

## 2020-06-23 DIAGNOSIS — B37.9 CANDIDIASIS: Primary | ICD-10-CM

## 2020-06-23 LAB — GLUCOSE BLDC GLUCOMTR-MCNC: 113 MG/DL (ref 70–130)

## 2020-06-23 PROCEDURE — 82962 GLUCOSE BLOOD TEST: CPT

## 2020-06-23 PROCEDURE — 25010000002 LORAZEPAM PER 2 MG

## 2020-06-23 PROCEDURE — 25010000002 FLUCONAZOLE PER 200 MG: Performed by: UROLOGY

## 2020-06-23 PROCEDURE — 25010000002 ONDANSETRON PER 1 MG: Performed by: NURSE ANESTHETIST, CERTIFIED REGISTERED

## 2020-06-23 PROCEDURE — 25010000002 GENTAMICIN PER 80 MG: Performed by: UROLOGY

## 2020-06-23 PROCEDURE — 25010000002 PROPOFOL 200 MG/20ML EMULSION: Performed by: NURSE ANESTHETIST, CERTIFIED REGISTERED

## 2020-06-23 PROCEDURE — 25010000002 DEXAMETHASONE PER 1 MG: Performed by: NURSE ANESTHETIST, CERTIFIED REGISTERED

## 2020-06-23 PROCEDURE — 25010000002 VANCOMYCIN 5 G RECONSTITUTED SOLUTION 5,000 MG VIAL: Performed by: UROLOGY

## 2020-06-23 PROCEDURE — 94799 UNLISTED PULMONARY SVC/PX: CPT

## 2020-06-23 PROCEDURE — 25010000002 FENTANYL CITRATE (PF) 100 MCG/2ML SOLUTION: Performed by: NURSE ANESTHETIST, CERTIFIED REGISTERED

## 2020-06-23 PROCEDURE — 25010000002 MIDAZOLAM PER 1MG: Performed by: NURSE ANESTHETIST, CERTIFIED REGISTERED

## 2020-06-23 RX ORDER — GABAPENTIN 300 MG/1
300 CAPSULE ORAL 3 TIMES DAILY
Qty: 30 CAPSULE | Refills: 0 | Status: SHIPPED | OUTPATIENT
Start: 2020-06-23 | End: 2020-07-16 | Stop reason: SDUPTHER

## 2020-06-23 RX ORDER — SULFAMETHOXAZOLE AND TRIMETHOPRIM 800; 160 MG/1; MG/1
1 TABLET ORAL DAILY
Qty: 5 TABLET | Refills: 0 | Status: SHIPPED | OUTPATIENT
Start: 2020-06-23 | End: 2020-07-16 | Stop reason: SDUPTHER

## 2020-06-23 RX ORDER — LORAZEPAM 2 MG/ML
0.25 INJECTION INTRAMUSCULAR AS NEEDED
Status: DISCONTINUED | OUTPATIENT
Start: 2020-06-23 | End: 2020-06-23 | Stop reason: HOSPADM

## 2020-06-23 RX ORDER — SODIUM CHLORIDE 9 MG/ML
100 INJECTION, SOLUTION INTRAVENOUS CONTINUOUS
Status: CANCELLED | OUTPATIENT
Start: 2020-06-23

## 2020-06-23 RX ORDER — SODIUM CHLORIDE 0.9 % (FLUSH) 0.9 %
10 SYRINGE (ML) INJECTION AS NEEDED
Status: DISCONTINUED | OUTPATIENT
Start: 2020-06-23 | End: 2020-06-23 | Stop reason: HOSPADM

## 2020-06-23 RX ORDER — ONDANSETRON 2 MG/ML
INJECTION INTRAMUSCULAR; INTRAVENOUS AS NEEDED
Status: DISCONTINUED | OUTPATIENT
Start: 2020-06-23 | End: 2020-06-23 | Stop reason: SURG

## 2020-06-23 RX ORDER — LIDOCAINE HYDROCHLORIDE 20 MG/ML
INJECTION, SOLUTION INTRAVENOUS AS NEEDED
Status: DISCONTINUED | OUTPATIENT
Start: 2020-06-23 | End: 2020-06-23 | Stop reason: SURG

## 2020-06-23 RX ORDER — FLUCONAZOLE 100 MG/1
100 TABLET ORAL DAILY
Qty: 5 TABLET | Refills: 0 | Status: SHIPPED | OUTPATIENT
Start: 2020-06-23 | End: 2020-06-28

## 2020-06-23 RX ORDER — KETAMINE HCL IN NACL, ISO-OSM 100MG/10ML
SYRINGE (ML) INJECTION AS NEEDED
Status: DISCONTINUED | OUTPATIENT
Start: 2020-06-23 | End: 2020-06-23 | Stop reason: SURG

## 2020-06-23 RX ORDER — MIDAZOLAM HYDROCHLORIDE 2 MG/2ML
INJECTION, SOLUTION INTRAMUSCULAR; INTRAVENOUS AS NEEDED
Status: DISCONTINUED | OUTPATIENT
Start: 2020-06-23 | End: 2020-06-23 | Stop reason: SURG

## 2020-06-23 RX ORDER — PROPOFOL 10 MG/ML
INJECTION, EMULSION INTRAVENOUS AS NEEDED
Status: DISCONTINUED | OUTPATIENT
Start: 2020-06-23 | End: 2020-06-23 | Stop reason: SURG

## 2020-06-23 RX ORDER — FENTANYL CITRATE 50 UG/ML
INJECTION, SOLUTION INTRAMUSCULAR; INTRAVENOUS AS NEEDED
Status: DISCONTINUED | OUTPATIENT
Start: 2020-06-23 | End: 2020-06-23 | Stop reason: SURG

## 2020-06-23 RX ORDER — FAMOTIDINE 10 MG/ML
20 INJECTION, SOLUTION INTRAVENOUS
Status: COMPLETED | OUTPATIENT
Start: 2020-06-23 | End: 2020-06-23

## 2020-06-23 RX ORDER — CEFAZOLIN SODIUM 2 G/50ML
2 SOLUTION INTRAVENOUS ONCE
Status: DISCONTINUED | OUTPATIENT
Start: 2020-06-23 | End: 2020-06-23 | Stop reason: SDUPTHER

## 2020-06-23 RX ORDER — NYSTATIN 100000 [USP'U]/G
POWDER TOPICAL 3 TIMES DAILY
Qty: 60 G | Refills: 1 | Status: SHIPPED | OUTPATIENT
Start: 2020-06-23 | End: 2020-07-16 | Stop reason: SDUPTHER

## 2020-06-23 RX ORDER — DEXAMETHASONE SODIUM PHOSPHATE 4 MG/ML
INJECTION, SOLUTION INTRA-ARTICULAR; INTRALESIONAL; INTRAMUSCULAR; INTRAVENOUS; SOFT TISSUE AS NEEDED
Status: DISCONTINUED | OUTPATIENT
Start: 2020-06-23 | End: 2020-06-23 | Stop reason: SURG

## 2020-06-23 RX ORDER — SODIUM CHLORIDE 9 MG/ML
100 INJECTION, SOLUTION INTRAVENOUS CONTINUOUS
Status: DISCONTINUED | OUTPATIENT
Start: 2020-06-23 | End: 2020-06-23 | Stop reason: HOSPADM

## 2020-06-23 RX ORDER — SCOLOPAMINE TRANSDERMAL SYSTEM 1 MG/1
1 PATCH, EXTENDED RELEASE TRANSDERMAL CONTINUOUS
Status: DISCONTINUED | OUTPATIENT
Start: 2020-06-23 | End: 2020-06-23 | Stop reason: HOSPADM

## 2020-06-23 RX ORDER — FLUCONAZOLE 2 MG/ML
200 INJECTION, SOLUTION INTRAVENOUS ONCE
Status: DISCONTINUED | OUTPATIENT
Start: 2020-06-23 | End: 2020-06-23 | Stop reason: HOSPADM

## 2020-06-23 RX ORDER — ACETAMINOPHEN 500 MG
1000 TABLET ORAL ONCE
Status: COMPLETED | OUTPATIENT
Start: 2020-06-23 | End: 2020-06-23

## 2020-06-23 RX ORDER — FLUCONAZOLE 2 MG/ML
200 INJECTION, SOLUTION INTRAVENOUS ONCE
Status: COMPLETED | OUTPATIENT
Start: 2020-06-23 | End: 2020-06-23

## 2020-06-23 RX ORDER — LORAZEPAM 2 MG/ML
INJECTION INTRAMUSCULAR
Status: COMPLETED
Start: 2020-06-23 | End: 2020-06-23

## 2020-06-23 RX ADMIN — ONDANSETRON 4 MG: 2 INJECTION INTRAMUSCULAR; INTRAVENOUS at 07:57

## 2020-06-23 RX ADMIN — DEXAMETHASONE SODIUM PHOSPHATE 4 MG: 4 INJECTION, SOLUTION INTRAMUSCULAR; INTRAVENOUS at 07:57

## 2020-06-23 RX ADMIN — Medication 30 MG: at 07:57

## 2020-06-23 RX ADMIN — PROPOFOL 140 MG: 10 INJECTION, EMULSION INTRAVENOUS at 07:57

## 2020-06-23 RX ADMIN — LIDOCAINE HYDROCHLORIDE 60 MG: 20 INJECTION, SOLUTION INTRAVENOUS at 07:57

## 2020-06-23 RX ADMIN — GENTAMICIN SULFATE 400 MG: 40 INJECTION, SOLUTION INTRAMUSCULAR; INTRAVENOUS at 07:57

## 2020-06-23 RX ADMIN — ACETAMINOPHEN 1000 MG: 500 TABLET, FILM COATED ORAL at 07:18

## 2020-06-23 RX ADMIN — VANCOMYCIN HYDROCHLORIDE 1500 MG: 500 INJECTION, POWDER, LYOPHILIZED, FOR SOLUTION INTRAVENOUS at 07:17

## 2020-06-23 RX ADMIN — FAMOTIDINE 20 MG: 10 INJECTION INTRAVENOUS at 07:18

## 2020-06-23 RX ADMIN — VANCOMYCIN HYDROCHLORIDE 500 ML: 500 INJECTION, POWDER, LYOPHILIZED, FOR SOLUTION INTRAVENOUS at 07:57

## 2020-06-23 RX ADMIN — MIDAZOLAM HYDROCHLORIDE 2 MG: 1 INJECTION, SOLUTION INTRAMUSCULAR; INTRAVENOUS at 07:54

## 2020-06-23 RX ADMIN — LORAZEPAM 0.25 MG: 2 INJECTION INTRAMUSCULAR at 07:17

## 2020-06-23 RX ADMIN — LORAZEPAM 0.25 MG: 2 INJECTION INTRAMUSCULAR; INTRAVENOUS at 07:17

## 2020-06-23 RX ADMIN — SCOPALAMINE 1 PATCH: 1 PATCH, EXTENDED RELEASE TRANSDERMAL at 07:18

## 2020-06-23 RX ADMIN — SUGAMMADEX 500 MG: 100 INJECTION, SOLUTION INTRAVENOUS at 08:20

## 2020-06-23 RX ADMIN — FENTANYL CITRATE 100 MCG: 50 INJECTION INTRAMUSCULAR; INTRAVENOUS at 07:57

## 2020-06-23 RX ADMIN — FLUCONAZOLE 200 MG: 2 INJECTION, SOLUTION INTRAVENOUS at 08:40

## 2020-06-23 RX ADMIN — SODIUM CHLORIDE 100 ML/HR: 9 INJECTION, SOLUTION INTRAVENOUS at 07:15

## 2020-06-23 NOTE — PROGRESS NOTES
After the patient was under anesthesia and I was beginning to prep the patient, I quickly recognized that he has significant candidiasis in his bilateral inguinal folds, despite being on oral fluconazole for 5 days.  The patient did not notify me or anyone else of this while he was in the preoperative area.    Due to infection risk with an implantable device, we will have to abort the case.  I will give him IV fluconazole, as well as nystatin powder x2 weeks.  I will see him again then and I will reschedule his case for 7/14/2020.

## 2020-06-23 NOTE — SIGNIFICANT NOTE
0900- DR Lozano into PACUat pt bedside, spoke with pt concerning cancellation of procedure and plan for rescheduling. Pt drowsy, verbalized understanding.

## 2020-06-23 NOTE — ANESTHESIA POSTPROCEDURE EVALUATION
Patient: Flaco Flores    Procedure Summary     Date:  06/23/20 Room / Location:  [facility] OR  /  [facility]    Anesthesia Start:  0754 Anesthesia Stop:  0841    Procedure:  PENILE PROSTHESIS PLACEMENT (N/A Penis) Diagnosis:       Erectile dysfunction, unspecified erectile dysfunction type      (Erectile dysfunction, unspecified erectile dysfunction type [N52.9])    Surgeon:  Akhil Lozano MD Provider:  Ghassan Jarrett CRNA    Anesthesia Type:  general ASA Status:  2          Anesthesia Type: general    Vitals  Vitals Value Taken Time   /69 6/23/2020  8:39 AM   Temp 97.6 °F (36.4 °C) 6/23/2020  8:35 AM   Pulse 87 6/23/2020  8:41 AM   Resp 15 6/23/2020  8:35 AM   SpO2 100 % 6/23/2020  8:41 AM   Vitals shown include unvalidated device data.        Post Anesthesia Care and Evaluation    Patient location during evaluation: PACU  Patient participation: complete - patient participated  Level of consciousness: awake and sleepy but conscious  Pain management: adequate  Airway patency: patent  Anesthetic complications: No anesthetic complications  PONV Status: none  Cardiovascular status: acceptable and hemodynamically stable  Respiratory status: acceptable, nonlabored ventilation, spontaneous ventilation and face mask  Hydration status: acceptable

## 2020-06-23 NOTE — ANESTHESIA PREPROCEDURE EVALUATION
Anesthesia Evaluation     Patient summary reviewed and Nursing notes reviewed   no history of anesthetic complications:  NPO Solid Status: > 8 hours  NPO Liquid Status: > 8 hours           Airway   Mallampati: I  TM distance: >3 FB  Neck ROM: full  Possible difficult intubation  Dental - normal exam         Pulmonary - negative pulmonary ROS and normal exam   (-) not a smoker  Cardiovascular - normal exam  Exercise tolerance: good (4-7 METS)    ECG reviewed    (+) hypertension well controlled less than 2 medications,     ROS comment: Vent. Rate : 098 BPM     Atrial Rate : 098 BPM     P-R Int : 160 ms          QRS Dur : 082 ms      QT Int : 320 ms       P-R-T Axes : 071 086 040 degrees     QTc Int : 408 ms     Normal sinus rhythm  Normal ECG  No previous ECGs available  Confirmed by TAJ REAGAN ([id number]) on 6/19/2020 9:35:02 PM     Referred By: /75 DR PRAKASH               Neuro/Psych- negative ROS  GI/Hepatic/Renal/Endo    (+)  GERD,  diabetes mellitus type 2,     Musculoskeletal (-) negative ROS    Abdominal  - normal exam    Bowel sounds: normal.   Substance History   (+) alcohol use,   (-) drug use     OB/GYN negative ob/gyn ROS         Other        ROS/Med Hx Other: Uncontrolled type 2 diabetes mellitus with hyperglycemia (CMS/HCC)  HTN (hypertension)  Erectile dysfunction due to diseases classified elsewhere  Abnormal glucose  Erectile dysfunction    No previous surgeries      Phys Exam Other: Significant underbite. No loose teeth noted on exam. Video laryngoscope in OR.               Anesthesia Plan    ASA 2     general   (Risks and benefits of general anesthesia discussed with patient, including, aspiration, recall, dental damage, cardiac or respiratory compromise, stroke, fluctuations in blood pressure, seizure or death.     Pt advised that a endotracheal tube (ETT), laryngeal mask airway (LMA) or mask would be utilized to maintain the airway. Pt verbalized understanding and agreed to plan.)  intravenous  induction     Anesthetic plan, all risks, benefits, and alternatives have been provided, discussed and informed consent has been obtained with: patient.    Plan discussed with CRNA.

## 2020-06-23 NOTE — ANESTHESIA PROCEDURE NOTES
Airway  Urgency: elective    Date/Time: 6/23/2020 8:00 AM  Airway not difficult    General Information and Staff    Patient location during procedure: OR  CRNA: Ghassan Jarrett CRNA    Indications and Patient Condition  Indications for airway management: airway protection    Preoxygenated: yes  MILS maintained throughout  Mask difficulty assessment: 1 - vent by mask    Final Airway Details  Final airway type: endotracheal airway      Successful airway: ETT  Cuffed: yes   Successful intubation technique: direct laryngoscopy and video laryngoscopy  Facilitating devices/methods: intubating stylet  Endotracheal tube insertion site: oral  Blade: Glidescope  Blade size: 4  ETT size (mm): 7.5  Cormack-Lehane Classification: grade III - view of epiglottis only  Placement verified by: chest auscultation and capnometry   Cuff volume (mL): 6  Measured from: teeth  ETT/EBT  to teeth (cm): 22  Number of attempts at approach: 1  Assessment: lips, teeth, and gum same as pre-op and atraumatic intubation    Additional Comments  Airway placed without problems. Dentition and lips as noted on pre-induction. ETT cuff inflated to minimal occlusive pressure. ETT secured. Significantly difficult intubation. Initial look with DL revealed a grade 3 airway. Unable to place ETT due to significant anterior placement of VC. Second attempt with VL revealed a grade 1 view. Anterior placement of airway required CP with BURP technique and manipulation of ETT stylet to place ETT. Also, significant dental caries are present.

## 2020-06-23 NOTE — DISCHARGE INSTRUCTIONS
No pushing, pulling, tugging,  heavy lifting, or strenuous activity.  No major decision making, driving, or drinking alcoholic beverages for 24 hours. ( due to the medications you have  received)  Always use good hand hygiene/washing techniques.  NO driving while taking pain medications.    * if you have an incision:  Check your incision area every day for signs of infection.   Check for:  * more redness, swelling, or pain  *more fluid or blood  *warmth  *pus or bad smell  To assist you in voiding:    Drink plenty of fluids  Listen to running water while attempting to void.    If you are unable to urinate and you have an uncomfortable urge to void or it has been   6 hours since you were discharged, return to the Emergency Room            Please call to confirm and schedule a follow-up appointment with Dr. Lozano on 7/16/2020.    We will reschedule your surgery for 7/29/2020 with COVID testing on 7/27/2020.

## 2020-07-16 ENCOUNTER — OFFICE VISIT (OUTPATIENT)
Dept: UROLOGY | Facility: CLINIC | Age: 40
End: 2020-07-16

## 2020-07-16 VITALS
BODY MASS INDEX: 28.11 KG/M2 | OXYGEN SATURATION: 98 % | WEIGHT: 219 LBS | TEMPERATURE: 98.2 F | HEART RATE: 87 BPM | HEIGHT: 74 IN | RESPIRATION RATE: 18 BRPM

## 2020-07-16 DIAGNOSIS — N52.9 ERECTILE DYSFUNCTION, UNSPECIFIED ERECTILE DYSFUNCTION TYPE: Primary | ICD-10-CM

## 2020-07-16 DIAGNOSIS — B37.9 CANDIDIASIS: ICD-10-CM

## 2020-07-16 PROCEDURE — 99213 OFFICE O/P EST LOW 20 MIN: CPT | Performed by: UROLOGY

## 2020-07-16 RX ORDER — GABAPENTIN 300 MG/1
300 CAPSULE ORAL 3 TIMES DAILY
Qty: 30 CAPSULE | Refills: 0 | Status: SHIPPED | OUTPATIENT
Start: 2020-07-16 | End: 2020-08-11

## 2020-07-16 RX ORDER — FLUCONAZOLE 100 MG/1
100 TABLET ORAL DAILY
Qty: 5 TABLET | Refills: 0 | Status: SHIPPED | OUTPATIENT
Start: 2020-07-16 | End: 2020-07-21

## 2020-07-16 RX ORDER — SULFAMETHOXAZOLE AND TRIMETHOPRIM 800; 160 MG/1; MG/1
1 TABLET ORAL DAILY
Qty: 5 TABLET | Refills: 0 | Status: ON HOLD | OUTPATIENT
Start: 2020-07-16 | End: 2020-07-29 | Stop reason: SDUPTHER

## 2020-07-16 RX ORDER — NYSTATIN 100000 [USP'U]/G
POWDER TOPICAL 3 TIMES DAILY
Qty: 60 G | Refills: 1 | Status: SHIPPED | OUTPATIENT
Start: 2020-07-16 | End: 2020-08-11

## 2020-07-16 NOTE — PROGRESS NOTES
Chief Complaint  Erectile dysfunction    HPI  Mr. Flores is a 39 y.o. male with history of T2DM, HTN who presents with erectile dysfunction. He used to weigh > 350 lbs and now weights 215.     He presents today for follow-up after his IPP had to be canceled due to severe and the diocese that was discovered and new on the day of his surgery.  He states that this has completely resolved after Buddhism use of the nystatin powder.    To review,  He primarily has difficulty with achieving and maintaining an erection.  He has tried cialis in the past with unclear results.  Without medication, he rates his erectile rigidity as a 5 on a scale of 0-10 (with 5 being just firm enough for penetration).   SRINIVAS - 6  He thinks he did take the oral PDE5- on an empty stomach, 1 hour prior to intercourse and without alcohol. He does not take oral nitrates for chest pain.   He denies any issues with energy or libido.   He denies anxiety about erections.  His diabetes is managed well with , metformin, Januvia, and trulicity  His hypertension is well managed with lisinopril      Past Medical History  Past Medical History:   Diagnosis Date   • Acid reflux    • Diabetes (CMS/[facility])    • Erectile dysfunction    • History of being tatooed     x1   • Wears glasses        Past Surgical History   has a past surgical history that includes No past surgeries.    Medications  He has a current medication list which includes the following prescription(s): dulaglutide, empagliflozin, fluoxetine, gabapentin, gabapentin, glimepiride, lisinopril, metformin, nystatin, omeprazole, simvastatin, sulfamethoxazole-trimethoprim, and sulfamethoxazole-trimethoprim.    Allergies  No Known Allergies    Social History  He  reports that he has never smoked. He has never used smokeless tobacco. He reports that he drinks alcohol. He reports that he does not use drugs.    Family History  He has no family history of prostate cancer.  There is a strong family history  "of cardiovascular disease.  He family history includes Heart disease in his maternal grandfather and paternal grandfather.    Review of Systems  A complete review of systems was performed and was unremarkable.  All other systems were otherwise negative.     Physical Exam  Pulse 87   Temp 98.2 °F (36.8 °C)   Resp 18   Ht 188 cm (74.02\")   Wt 99.3 kg (219 lb)   SpO2 98%   BMI 28.11 kg/m²   Body mass index is 28.11 kg/m².  Constitutional: NAD, WDWN.   HEENT: NCAT. Conjunctivae normal.  MMM.    Cardiovascular: Regular rate.  Pulmonary/Chest: Respirations are even and non-labored bilaterally.  Abdominal: Soft. No distension, tenderness, masses or guarding. No CVA tenderness.  Neurological: A + O x 3.  Cranial Nerves II-XII grossly intact. Normal gait.  Extremities: DWAIN x 4, Warm. No clubbing.  No cyanosis.    Skin: Pink, warm and dry.  No rashes noted.      Labs  Hematocrit (%)   Date Value   06/18/2020 47.7        12/17/18  Total  T - 527  Free T -42    No results found for: PSA     Lab Results   Component Value Date    HGBA1C 7.80 (H) 06/18/2020       Assessment  Mr. Flores is a 39 y.o. male with hypertension, diabetes mellitus, erectile dysfunction, who recently had to have IPP canceled due to groin candidiasis.  He presents today for follow-up and this issue has resolved.  He still desires penile prosthesis.    We discussed the risk, benefits, and alternatives to penile prosthesis.  He voices understanding and wished to proceed.  I did tell him that with his history of diabetes mellitus which is not completely well controlled, though his A1c is acceptable, he is somewhat higher than the 2% chance of infection.  He says that he understands this.  We also discussed other risks such as bleeding, perforation, damage to nearby structures, need for future revisions during his long lifetime.    Plan  1.  Proceed w IPP on 7/29/20.  PAT and cover testing the monday before   2.  Fluconazole, Bactrim, and gabapentin to " start 5 days before surgery; continue nystatin powder daily.

## 2020-07-27 ENCOUNTER — LAB (OUTPATIENT)
Dept: LAB | Facility: HOSPITAL | Age: 40
End: 2020-07-27

## 2020-07-27 DIAGNOSIS — N52.9 ERECTILE DYSFUNCTION, UNSPECIFIED ERECTILE DYSFUNCTION TYPE: ICD-10-CM

## 2020-07-27 LAB
REF LAB TEST METHOD: NORMAL
SARS-COV-2 RNA RESP QL NAA+PROBE: NOT DETECTED

## 2020-07-27 PROCEDURE — U0004 COV-19 TEST NON-CDC HGH THRU: HCPCS

## 2020-07-27 PROCEDURE — C9803 HOPD COVID-19 SPEC COLLECT: HCPCS

## 2020-07-27 PROCEDURE — U0002 COVID-19 LAB TEST NON-CDC: HCPCS

## 2020-07-28 RX ORDER — FLUCONAZOLE 100 MG/1
100 TABLET ORAL DAILY
COMMUNITY
End: 2020-11-11

## 2020-07-29 ENCOUNTER — HOSPITAL ENCOUNTER (OUTPATIENT)
Facility: HOSPITAL | Age: 40
Setting detail: HOSPITAL OUTPATIENT SURGERY
Discharge: HOME | End: 2020-07-29
Attending: UROLOGY | Admitting: UROLOGY

## 2020-07-29 ENCOUNTER — ANESTHESIA EVENT (OUTPATIENT)
Dept: PERIOP | Facility: HOSPITAL | Age: 40
End: 2020-07-29

## 2020-07-29 ENCOUNTER — ANESTHESIA (OUTPATIENT)
Dept: PERIOP | Facility: HOSPITAL | Age: 40
End: 2020-07-29

## 2020-07-29 VITALS
TEMPERATURE: 97.4 F | SYSTOLIC BLOOD PRESSURE: 111 MMHG | WEIGHT: 219 LBS | RESPIRATION RATE: 18 BRPM | HEART RATE: 93 BPM | BODY MASS INDEX: 28.11 KG/M2 | HEIGHT: 74 IN | DIASTOLIC BLOOD PRESSURE: 68 MMHG | OXYGEN SATURATION: 97 %

## 2020-07-29 DIAGNOSIS — N52.9 ERECTILE DYSFUNCTION, UNSPECIFIED ERECTILE DYSFUNCTION TYPE: ICD-10-CM

## 2020-07-29 LAB — GLUCOSE BLDC GLUCOMTR-MCNC: 133 MG/DL (ref 70–130)

## 2020-07-29 PROCEDURE — 25010000002 KETOROLAC TROMETHAMINE PER 15 MG: Performed by: NURSE ANESTHETIST, CERTIFIED REGISTERED

## 2020-07-29 PROCEDURE — 25010000002 HYDROMORPHONE PER 4 MG: Performed by: NURSE ANESTHETIST, CERTIFIED REGISTERED

## 2020-07-29 PROCEDURE — 25010000002 DEXAMETHASONE PER 1 MG: Performed by: NURSE ANESTHETIST, CERTIFIED REGISTERED

## 2020-07-29 PROCEDURE — 82962 GLUCOSE BLOOD TEST: CPT

## 2020-07-29 PROCEDURE — 25010000002 PROPOFOL 200 MG/20ML EMULSION: Performed by: NURSE ANESTHETIST, CERTIFIED REGISTERED

## 2020-07-29 PROCEDURE — 25010000002 ONDANSETRON PER 1 MG: Performed by: NURSE ANESTHETIST, CERTIFIED REGISTERED

## 2020-07-29 PROCEDURE — 54405 INSERT MULTI-COMP PENIS PROS: CPT | Performed by: UROLOGY

## 2020-07-29 PROCEDURE — 25010000002 ONDANSETRON PER 1 MG

## 2020-07-29 PROCEDURE — 25010000002 GENTAMICIN PER 80 MG: Performed by: UROLOGY

## 2020-07-29 PROCEDURE — 25010000002 VANCOMYCIN 5 G RECONSTITUTED SOLUTION 5,000 MG VIAL: Performed by: UROLOGY

## 2020-07-29 PROCEDURE — 25010000002 HALOPERIDOL LACTATE PER 5 MG: Performed by: NURSE ANESTHETIST, CERTIFIED REGISTERED

## 2020-07-29 PROCEDURE — 25010000002 VANCOMYCIN PER 500 MG: Performed by: UROLOGY

## 2020-07-29 PROCEDURE — 54360 PENIS PLASTIC SURGERY: CPT | Performed by: UROLOGY

## 2020-07-29 PROCEDURE — C1813 PROSTHESIS, PENILE, INFLATAB: HCPCS | Performed by: UROLOGY

## 2020-07-29 PROCEDURE — 94799 UNLISTED PULMONARY SVC/PX: CPT

## 2020-07-29 PROCEDURE — 25010000002 MIDAZOLAM PER 1MG: Performed by: NURSE ANESTHETIST, CERTIFIED REGISTERED

## 2020-07-29 DEVICE — PRSTH PENILE CYL LGX PRECONN: Type: IMPLANTABLE DEVICE | Site: PENIS | Status: FUNCTIONAL

## 2020-07-29 DEVICE — EXT REAR/TP PENILE SPECTRA STACK 1.5CM: Type: IMPLANTABLE DEVICE | Site: PENIS | Status: FUNCTIONAL

## 2020-07-29 DEVICE — RESVR PENILE CONCEAL 65TO100ML: Type: IMPLANTABLE DEVICE | Site: PENIS | Status: FUNCTIONAL

## 2020-07-29 RX ORDER — DEXAMETHASONE SODIUM PHOSPHATE 4 MG/ML
INJECTION, SOLUTION INTRA-ARTICULAR; INTRALESIONAL; INTRAMUSCULAR; INTRAVENOUS; SOFT TISSUE AS NEEDED
Status: DISCONTINUED | OUTPATIENT
Start: 2020-07-29 | End: 2020-07-29 | Stop reason: SURG

## 2020-07-29 RX ORDER — KETOROLAC TROMETHAMINE 30 MG/ML
INJECTION, SOLUTION INTRAMUSCULAR; INTRAVENOUS AS NEEDED
Status: DISCONTINUED | OUTPATIENT
Start: 2020-07-29 | End: 2020-07-29 | Stop reason: SURG

## 2020-07-29 RX ORDER — PROMETHAZINE HYDROCHLORIDE 25 MG/1
25 SUPPOSITORY RECTAL ONCE AS NEEDED
Status: DISCONTINUED | OUTPATIENT
Start: 2020-07-29 | End: 2020-07-29 | Stop reason: HOSPADM

## 2020-07-29 RX ORDER — ONDANSETRON 2 MG/ML
INJECTION INTRAMUSCULAR; INTRAVENOUS
Status: COMPLETED
Start: 2020-07-29 | End: 2020-07-29

## 2020-07-29 RX ORDER — HYDROMORPHONE HCL 110MG/55ML
PATIENT CONTROLLED ANALGESIA SYRINGE INTRAVENOUS AS NEEDED
Status: DISCONTINUED | OUTPATIENT
Start: 2020-07-29 | End: 2020-07-29 | Stop reason: SURG

## 2020-07-29 RX ORDER — HALOPERIDOL 5 MG/ML
INJECTION INTRAMUSCULAR AS NEEDED
Status: DISCONTINUED | OUTPATIENT
Start: 2020-07-29 | End: 2020-07-29 | Stop reason: SURG

## 2020-07-29 RX ORDER — DOCUSATE SODIUM 100 MG/1
100 CAPSULE, LIQUID FILLED ORAL 2 TIMES DAILY
Qty: 15 CAPSULE | Refills: 1 | Status: SHIPPED | OUTPATIENT
Start: 2020-07-29 | End: 2020-08-11

## 2020-07-29 RX ORDER — ONDANSETRON 2 MG/ML
INJECTION INTRAMUSCULAR; INTRAVENOUS AS NEEDED
Status: DISCONTINUED | OUTPATIENT
Start: 2020-07-29 | End: 2020-07-29 | Stop reason: SURG

## 2020-07-29 RX ORDER — GABAPENTIN 300 MG/1
300 CAPSULE ORAL 3 TIMES DAILY
Qty: 30 CAPSULE | Refills: 0 | Status: SHIPPED | OUTPATIENT
Start: 2020-07-29 | End: 2021-02-24

## 2020-07-29 RX ORDER — PROMETHAZINE HYDROCHLORIDE 25 MG/ML
6.25 INJECTION, SOLUTION INTRAMUSCULAR; INTRAVENOUS ONCE AS NEEDED
Status: DISCONTINUED | OUTPATIENT
Start: 2020-07-29 | End: 2020-07-29 | Stop reason: HOSPADM

## 2020-07-29 RX ORDER — ACETAMINOPHEN 325 MG/1
650 TABLET ORAL EVERY 6 HOURS
Qty: 30 TABLET | Refills: 0 | Status: SHIPPED | OUTPATIENT
Start: 2020-07-29 | End: 2020-08-01

## 2020-07-29 RX ORDER — MIDAZOLAM HYDROCHLORIDE 2 MG/2ML
INJECTION, SOLUTION INTRAMUSCULAR; INTRAVENOUS AS NEEDED
Status: DISCONTINUED | OUTPATIENT
Start: 2020-07-29 | End: 2020-07-29 | Stop reason: SURG

## 2020-07-29 RX ORDER — PROPOFOL 10 MG/ML
INJECTION, EMULSION INTRAVENOUS AS NEEDED
Status: DISCONTINUED | OUTPATIENT
Start: 2020-07-29 | End: 2020-07-29 | Stop reason: SURG

## 2020-07-29 RX ORDER — SODIUM CHLORIDE 9 MG/ML
100 INJECTION, SOLUTION INTRAVENOUS CONTINUOUS
Status: DISCONTINUED | OUTPATIENT
Start: 2020-07-29 | End: 2020-07-29 | Stop reason: HOSPADM

## 2020-07-29 RX ORDER — LIDOCAINE HYDROCHLORIDE 20 MG/ML
INJECTION, SOLUTION INTRAVENOUS AS NEEDED
Status: DISCONTINUED | OUTPATIENT
Start: 2020-07-29 | End: 2020-07-29 | Stop reason: SURG

## 2020-07-29 RX ORDER — DIAPER,BRIEF,INFANT-TODD,DISP
EACH MISCELLANEOUS AS NEEDED
Status: DISCONTINUED | OUTPATIENT
Start: 2020-07-29 | End: 2020-07-29 | Stop reason: HOSPADM

## 2020-07-29 RX ORDER — OXYCODONE HYDROCHLORIDE 5 MG/1
5 TABLET ORAL EVERY 6 HOURS PRN
Qty: 5 TABLET | Refills: 0 | Status: SHIPPED | OUTPATIENT
Start: 2020-07-29 | End: 2020-08-11

## 2020-07-29 RX ORDER — GENTAMICIN SULFATE 40 MG/ML
INJECTION, SOLUTION INTRAMUSCULAR; INTRAVENOUS AS NEEDED
Status: DISCONTINUED | OUTPATIENT
Start: 2020-07-29 | End: 2020-07-29 | Stop reason: HOSPADM

## 2020-07-29 RX ORDER — VANCOMYCIN HYDROCHLORIDE 500 MG/10ML
INJECTION, POWDER, LYOPHILIZED, FOR SOLUTION INTRAVENOUS AS NEEDED
Status: DISCONTINUED | OUTPATIENT
Start: 2020-07-29 | End: 2020-07-29 | Stop reason: HOSPADM

## 2020-07-29 RX ORDER — BUPIVACAINE HYDROCHLORIDE 5 MG/ML
INJECTION, SOLUTION EPIDURAL; INTRACAUDAL AS NEEDED
Status: DISCONTINUED | OUTPATIENT
Start: 2020-07-29 | End: 2020-07-29 | Stop reason: HOSPADM

## 2020-07-29 RX ORDER — MEPERIDINE HYDROCHLORIDE 25 MG/ML
12.5 INJECTION INTRAMUSCULAR; INTRAVENOUS; SUBCUTANEOUS
Status: DISCONTINUED | OUTPATIENT
Start: 2020-07-29 | End: 2020-07-29 | Stop reason: HOSPADM

## 2020-07-29 RX ORDER — SCOLOPAMINE TRANSDERMAL SYSTEM 1 MG/1
1 PATCH, EXTENDED RELEASE TRANSDERMAL CONTINUOUS
Status: DISCONTINUED | OUTPATIENT
Start: 2020-07-29 | End: 2020-07-29 | Stop reason: HOSPADM

## 2020-07-29 RX ORDER — SODIUM CHLORIDE, SODIUM LACTATE, POTASSIUM CHLORIDE, CALCIUM CHLORIDE 600; 310; 30; 20 MG/100ML; MG/100ML; MG/100ML; MG/100ML
1000 INJECTION, SOLUTION INTRAVENOUS CONTINUOUS
Status: CANCELLED | OUTPATIENT
Start: 2020-07-29

## 2020-07-29 RX ORDER — SULFAMETHOXAZOLE AND TRIMETHOPRIM 800; 160 MG/1; MG/1
1 TABLET ORAL DAILY
Qty: 5 TABLET | Refills: 0 | Status: SHIPPED | OUTPATIENT
Start: 2020-07-29 | End: 2020-08-11

## 2020-07-29 RX ORDER — SODIUM CHLORIDE 0.9 % (FLUSH) 0.9 %
10 SYRINGE (ML) INJECTION AS NEEDED
Status: DISCONTINUED | OUTPATIENT
Start: 2020-07-29 | End: 2020-07-29 | Stop reason: HOSPADM

## 2020-07-29 RX ORDER — KETAMINE HCL IN NACL, ISO-OSM 100MG/10ML
SYRINGE (ML) INJECTION AS NEEDED
Status: DISCONTINUED | OUTPATIENT
Start: 2020-07-29 | End: 2020-07-29 | Stop reason: SURG

## 2020-07-29 RX ORDER — PROMETHAZINE HYDROCHLORIDE 25 MG/1
25 TABLET ORAL ONCE AS NEEDED
Status: DISCONTINUED | OUTPATIENT
Start: 2020-07-29 | End: 2020-07-29 | Stop reason: HOSPADM

## 2020-07-29 RX ORDER — ONDANSETRON 2 MG/ML
4 INJECTION INTRAMUSCULAR; INTRAVENOUS ONCE AS NEEDED
Status: COMPLETED | OUTPATIENT
Start: 2020-07-29 | End: 2020-07-29

## 2020-07-29 RX ADMIN — HYDROMORPHONE HYDROCHLORIDE 0.5 MG: 2 INJECTION, SOLUTION INTRAMUSCULAR; INTRAVENOUS; SUBCUTANEOUS at 07:42

## 2020-07-29 RX ADMIN — VANCOMYCIN HYDROCHLORIDE 500 ML: 500 INJECTION, POWDER, LYOPHILIZED, FOR SOLUTION INTRAVENOUS at 07:26

## 2020-07-29 RX ADMIN — ONDANSETRON 4 MG: 2 INJECTION INTRAMUSCULAR; INTRAVENOUS at 10:24

## 2020-07-29 RX ADMIN — SCOPALAMINE 1 PATCH: 1 PATCH, EXTENDED RELEASE TRANSDERMAL at 07:05

## 2020-07-29 RX ADMIN — MIDAZOLAM HYDROCHLORIDE 2 MG: 1 INJECTION, SOLUTION INTRAMUSCULAR; INTRAVENOUS at 07:15

## 2020-07-29 RX ADMIN — KETOROLAC TROMETHAMINE 10 MG: 30 INJECTION, SOLUTION INTRAMUSCULAR at 07:38

## 2020-07-29 RX ADMIN — PROPOFOL 50 MG: 10 INJECTION, EMULSION INTRAVENOUS at 07:28

## 2020-07-29 RX ADMIN — ONDANSETRON 4 MG: 2 INJECTION INTRAMUSCULAR; INTRAVENOUS at 07:38

## 2020-07-29 RX ADMIN — LIDOCAINE HYDROCHLORIDE 100 MG: 20 INJECTION, SOLUTION INTRAVENOUS at 07:28

## 2020-07-29 RX ADMIN — GENTAMICIN SULFATE 420 MG: 40 INJECTION, SOLUTION INTRAMUSCULAR; INTRAVENOUS at 07:36

## 2020-07-29 RX ADMIN — PROPOFOL 150 MG: 10 INJECTION, EMULSION INTRAVENOUS at 07:31

## 2020-07-29 RX ADMIN — DEXAMETHASONE SODIUM PHOSPHATE 4 MG: 4 INJECTION, SOLUTION INTRAMUSCULAR; INTRAVENOUS at 07:28

## 2020-07-29 RX ADMIN — SODIUM CHLORIDE 100 ML/HR: 9 INJECTION, SOLUTION INTRAVENOUS at 06:54

## 2020-07-29 RX ADMIN — HALOPERIDOL LACTATE 0.5 MG: 5 INJECTION, SOLUTION INTRAMUSCULAR at 07:38

## 2020-07-29 RX ADMIN — VANCOMYCIN HYDROCHLORIDE 1500 MG: 500 INJECTION, POWDER, LYOPHILIZED, FOR SOLUTION INTRAVENOUS at 06:54

## 2020-07-29 RX ADMIN — Medication 25 MG: at 07:38

## 2020-07-29 NOTE — ANESTHESIA PREPROCEDURE EVALUATION
Anesthesia Evaluation     Patient summary reviewed and Nursing notes reviewed   NPO Solid Status: > 8 hours  NPO Liquid Status: > 8 hours           Airway   Mallampati: I  TM distance: >3 FB  Neck ROM: full  No difficulty expected  Dental - normal exam     Pulmonary - negative pulmonary ROS and normal exam   Cardiovascular - normal exam    (+) hypertension,       Neuro/Psych- negative ROS  GI/Hepatic/Renal/Endo    (+) obesity,  GERD,  diabetes mellitus,     Musculoskeletal (-) negative ROS    Abdominal  - normal exam    Bowel sounds: normal.   Substance History - negative use     OB/GYN negative ob/gyn ROS         Other                        Anesthesia Plan    ASA 2     general     intravenous induction     Anesthetic plan, all risks, benefits, and alternatives have been provided, discussed and informed consent has been obtained with: patient.    Plan discussed with attending.

## 2020-07-29 NOTE — ANESTHESIA POSTPROCEDURE EVALUATION
Patient: Flaco Flores    Procedure Summary     Date:  07/29/20 Room / Location:  [facility] OR  /  [facility]    Anesthesia Start:  0726 Anesthesia Stop:  1001    Procedure:  PENILE PROSTHESIS PLACEMENT (N/A Penis) Diagnosis:       Erectile dysfunction, unspecified erectile dysfunction type      (Erectile dysfunction, unspecified erectile dysfunction type [N52.9])    Surgeon:  Akhil Lozano MD Provider:  Hasmukh Smith CRNA    Anesthesia Type:  general ASA Status:  2          Anesthesia Type: general    Vitals  Vitals Value Taken Time   /71 7/29/2020 10:52 AM   Temp 97.8 °F (36.6 °C) 7/29/2020 10:52 AM   Pulse 93 7/29/2020 10:53 AM   Resp 17 7/29/2020 10:52 AM   SpO2 96 % 7/29/2020 10:53 AM   Vitals shown include unvalidated device data.        Post Anesthesia Care and Evaluation    Patient location during evaluation: PACU  Patient participation: complete - patient participated  Level of consciousness: awake and alert  Pain score: 0  Pain management: satisfactory to patient  Airway patency: patent  Anesthetic complications: No anesthetic complications  PONV Status: none  Cardiovascular status: acceptable and hemodynamically stable  Respiratory status: acceptable  Hydration status: acceptable

## 2020-07-30 ENCOUNTER — OFFICE VISIT (OUTPATIENT)
Dept: UROLOGY | Facility: CLINIC | Age: 40
End: 2020-07-30

## 2020-07-30 VITALS — WEIGHT: 219 LBS | BODY MASS INDEX: 28.11 KG/M2 | TEMPERATURE: 98 F | HEIGHT: 74 IN

## 2020-07-30 DIAGNOSIS — N52.9 ERECTILE DYSFUNCTION, UNSPECIFIED ERECTILE DYSFUNCTION TYPE: Primary | ICD-10-CM

## 2020-07-30 PROCEDURE — 99024 POSTOP FOLLOW-UP VISIT: CPT | Performed by: UROLOGY

## 2020-07-30 NOTE — PROGRESS NOTES
Patient is here today for his postop day 1 follow-up after penile prosthesis placement.  He is not having any fevers is having mild soreness/pain.  He has had excellent urine output and minimal drain output.  Today I removed his Rodriguez catheter, his bandages, and his drain.  He has minimal bruising on exam.  I deflated his device.  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

## 2020-08-11 ENCOUNTER — LAB (OUTPATIENT)
Dept: LAB | Facility: HOSPITAL | Age: 40
End: 2020-08-11

## 2020-08-11 ENCOUNTER — OFFICE VISIT (OUTPATIENT)
Dept: ENDOCRINOLOGY | Facility: CLINIC | Age: 40
End: 2020-08-11

## 2020-08-11 VITALS
OXYGEN SATURATION: 99 % | DIASTOLIC BLOOD PRESSURE: 74 MMHG | SYSTOLIC BLOOD PRESSURE: 104 MMHG | WEIGHT: 220.2 LBS | BODY MASS INDEX: 28.26 KG/M2 | HEART RATE: 100 BPM | HEIGHT: 74 IN

## 2020-08-11 DIAGNOSIS — E11.65 UNCONTROLLED TYPE 2 DIABETES MELLITUS WITH HYPERGLYCEMIA (HCC): Primary | ICD-10-CM

## 2020-08-11 DIAGNOSIS — E04.1 RIGHT THYROID NODULE: ICD-10-CM

## 2020-08-11 DIAGNOSIS — E78.2 MIXED HYPERLIPIDEMIA: ICD-10-CM

## 2020-08-11 LAB
EXPIRATION DATE: ABNORMAL
EXPIRATION DATE: NORMAL
GLUCOSE BLDC GLUCOMTR-MCNC: 152 MG/DL (ref 70–130)
HBA1C MFR BLD: 7.4 %
Lab: ABNORMAL
Lab: NORMAL

## 2020-08-11 PROCEDURE — 82570 ASSAY OF URINE CREATININE: CPT | Performed by: PHYSICIAN ASSISTANT

## 2020-08-11 PROCEDURE — 99214 OFFICE O/P EST MOD 30 MIN: CPT | Performed by: PHYSICIAN ASSISTANT

## 2020-08-11 PROCEDURE — 80061 LIPID PANEL: CPT | Performed by: PHYSICIAN ASSISTANT

## 2020-08-11 PROCEDURE — 82043 UR ALBUMIN QUANTITATIVE: CPT | Performed by: PHYSICIAN ASSISTANT

## 2020-08-11 PROCEDURE — 82947 ASSAY GLUCOSE BLOOD QUANT: CPT | Performed by: PHYSICIAN ASSISTANT

## 2020-08-11 PROCEDURE — 80053 COMPREHEN METABOLIC PANEL: CPT | Performed by: PHYSICIAN ASSISTANT

## 2020-08-11 PROCEDURE — 84443 ASSAY THYROID STIM HORMONE: CPT | Performed by: PHYSICIAN ASSISTANT

## 2020-08-11 PROCEDURE — 83036 HEMOGLOBIN GLYCOSYLATED A1C: CPT | Performed by: PHYSICIAN ASSISTANT

## 2020-08-11 RX ORDER — GLIMEPIRIDE 4 MG/1
TABLET ORAL
Qty: 60 TABLET | Refills: 6 | Status: SHIPPED | OUTPATIENT
Start: 2020-08-11 | End: 2020-11-11 | Stop reason: SDUPTHER

## 2020-08-11 NOTE — PROGRESS NOTES
"Chief Complaint  F/u for Diabetes Mellitus.     HPI   Flaco Flores is a 39 y.o. male who is here today for f/u of Diabetes Mellitus type 2. The initial diagnosis of diabetes was made 2015.    Had urology surgery about 2 weeks. Has been off Jardiance since then. Follows up with urology in 2 days.     A1C- (8/11/2020), 7.8 (5/8/2020), 8.5 (12/13/19)    Labs reviewed:  10/17/19- AC ratio 4, , LDL 92, , TSH 2.68    Diabetic complications: mild peripheral neuropathy  Eye exam current (within one year): due  Foot care and dental care: discussed    Current diabetic medications include:  Metformin 1000mg BID  Jardiance 25mg daily  Glimepiride 4mg before breakfast.   Trulicity 0.75mg sc weekly - mild nausea, but tolerable     Statin: zocor 40    Past medications: Januvia (insurance did not cover), tradjenta    Diabetic Monitoring  - no meter or log for review. Pt reports fbs ~110-130.     Nutrition:     Current diet: in general, an \"unhealthy\" diet, on average, 2-3 meals per day, no sugared drinks  Current exercise: none    The following portions of the patient's history were reviewed and updated by me as appropriate: allergies, current medications, past family history, past social history, past surgical history and problem list.        Past Medical History:   Diagnosis Date   • Acid reflux    • Chipped tooth     has several in back of mouth   • Depression    • Diabetes (CMS/[facility])    • Diarrhea     medicine induced   • Erectile dysfunction    • History of being tatooed     x1   • Wears glasses        Medications    Current Outpatient Medications:   •  Dulaglutide (Trulicity) 0.75 MG/0.5ML solution pen-injector, Inject 0.75 mg under the skin into the appropriate area as directed 1 (One) Time Per Week., Disp: 4 pen, Rfl: 6  •  Empagliflozin (Jardiance) 25 MG tablet, Take 25 mg by mouth Daily., Disp: 30 tablet, Rfl: 6  •  fluconazole (DIFLUCAN) 100 MG tablet, Take 100 mg by mouth Daily., Disp: , Rfl:   •  " "FLUoxetine (PROzac) 40 MG capsule, Take 40 mg by mouth Daily., Disp: , Rfl:   •  gabapentin (Neurontin) 300 MG capsule, Take 1 capsule by mouth 3 (Three) Times a Day. Start 5 days before surgery, Disp: 30 capsule, Rfl: 0  •  glimepiride (Amaryl) 4 MG tablet, Take 4mg before breakfast and dinner, Disp: 60 tablet, Rfl: 6  •  lisinopril (PRINIVIL,ZESTRIL) 5 MG tablet, Take 5 mg by mouth Daily., Disp: , Rfl:   •  metFORMIN (GLUCOPHAGE) 1000 MG tablet, Take 1 tablet by mouth 2 (Two) Times a Day., Disp: 60 tablet, Rfl: 6  •  omeprazole (priLOSEC) 20 MG capsule, Take 20 mg by mouth Daily., Disp: , Rfl:   •  simvastatin (ZOCOR) 40 MG tablet, Take 40 mg by mouth Every Night., Disp: , Rfl:     Review of Systems  Review of Systems   Constitutional: Negative for fatigue.   Cardiovascular: Negative for chest pain.   Endocrine: Negative for polydipsia, polyphagia and polyuria.   Skin: Negative for pallor.   Neurological: Positive for dizziness and headaches. Negative for tremors, seizures, speech difficulty and weakness.   Psychiatric/Behavioral: Negative for confusion. The patient is not nervous/anxious.         Physical Exam    /74   Pulse 100   Ht 188 cm (74\")   Wt 99.9 kg (220 lb 3.2 oz)   SpO2 99%   BMI 28.27 kg/m² Body mass index is 28.27 kg/m².  Physical Exam   Constitutional: He is oriented to person, place, and time. He appears well-developed. No distress.   HENT:   Head: Normocephalic.   Right Ear: External ear normal.   Left Ear: External ear normal.   Nose: Nose normal.   Eyes: Conjunctivae are normal. Right eye exhibits no discharge. Left eye exhibits no discharge. No scleral icterus.   Neck: Neck supple. No JVD present. No tracheal deviation present. Thyromegaly present.   Cardiovascular: Normal rate, regular rhythm, normal heart sounds and intact distal pulses.   No murmur heard.  Pulmonary/Chest: Effort normal and breath sounds normal. No respiratory distress. He has no wheezes.   Abdominal: Soft. Bowel " sounds are normal. There is no tenderness.   Musculoskeletal: He exhibits no edema or tenderness.   Neurological: He is alert and oriented to person, place, and time.   Skin: Skin is warm and dry. No rash noted. He is not diaphoretic. No erythema.   Psychiatric: He has a normal mood and affect. His behavior is normal. Judgment and thought content normal.       Labs and Imaging   Lab Results   Component Value Date    HGBA1C 7.4 08/11/2020    HGBA1C 7.80 (H) 06/18/2020    HGBA1C 7.8 05/08/2020     Office Visit on 08/11/2020   Component Date Value Ref Range Status   • Glucose 08/11/2020 152* 70 - 130 mg/dL Final   • Lot Number 08/11/2020 [id number]   Final   • Expiration Date 08/11/2020 12/28/20   Final   • Hemoglobin A1C 08/11/2020 7.4  % Final   • Lot Number 08/11/2020 [id number]   Final   • Expiration Date 08/11/2020 2/24/22   Final   • Microalbumin/Creatinine Ratio 08/11/2020    Final    Unable to calculate   • Creatinine, Urine 08/11/2020 163.1  mg/dL Final   • Microalbumin, Urine 08/11/2020 <1.2  mg/dL Final   • Glucose 08/11/2020 120* 65 - 99 mg/dL Final   • BUN 08/11/2020 17  6 - 20 mg/dL Final   • Creatinine 08/11/2020 0.79  0.76 - 1.27 mg/dL Final   • Sodium 08/11/2020 138  136 - 145 mmol/L Final   • Potassium 08/11/2020 4.4  3.5 - 5.2 mmol/L Final   • Chloride 08/11/2020 97* 98 - 107 mmol/L Final   • CO2 08/11/2020 27.4  22.0 - 29.0 mmol/L Final   • Calcium 08/11/2020 9.7  8.6 - 10.5 mg/dL Final   • Total Protein 08/11/2020 6.8  6.0 - 8.5 g/dL Final   • Albumin 08/11/2020 4.30  3.50 - 5.20 g/dL Final   • ALT (SGPT) 08/11/2020 35  1 - 41 U/L Final   • AST (SGOT) 08/11/2020 20  1 - 40 U/L Final   • Alkaline Phosphatase 08/11/2020 72  39 - 117 U/L Final   • Total Bilirubin 08/11/2020 0.4  0.0 - 1.2 mg/dL Final   • eGFR Non African Amer 08/11/2020 109  >60 mL/min/1.73 Final   • Globulin 08/11/2020 2.5  gm/dL Final   • A/G Ratio 08/11/2020 1.7  g/dL Final   • BUN/Creatinine Ratio 08/11/2020 21.5  7.0 - 25.0 Final    • Anion Gap 08/11/2020 13.6  5.0 - 15.0 mmol/L Final   • Total Cholesterol 08/11/2020 135  0 - 200 mg/dL Final   • Triglycerides 08/11/2020 144  0 - 150 mg/dL Final   • HDL Cholesterol 08/11/2020 39* 40 - 60 mg/dL Final   • LDL Cholesterol  08/11/2020 67  0 - 100 mg/dL Final   • VLDL Cholesterol 08/11/2020 28.8  5 - 40 mg/dL Final   • LDL/HDL Ratio 08/11/2020 1.72   Final   • TSH 08/11/2020 1.820  0.270 - 4.200 uIU/mL Final   Admission on 07/29/2020, Discharged on 07/29/2020   Component Date Value Ref Range Status   • Glucose 07/29/2020 133* 70 - 130 mg/dL Final    Serial Number: [id number]:  [id number]   Lab on 07/27/2020   Component Date Value Ref Range Status   • Reference Lab Report 07/27/2020 See scanned report   Final   • COVID19 07/27/2020 Not Detected  Not Detected - Ref. Range Final       Assessment / Plan   Flaco was seen today for follow-up and diabetes.    Diagnoses and all orders for this visit:    Uncontrolled type 2 diabetes mellitus with hyperglycemia (CMS/[facility])  -     Cancel: POCT Glucose  -     POCT Glucose  -     POC Glycosylated Hemoglobin (Hb A1C)  -     glimepiride (Amaryl) 4 MG tablet; Take 4mg before breakfast and dinner  -     Microalbumin / Creatinine Urine Ratio - Urine, Clean Catch  -     Comprehensive Metabolic Panel  -     Lipid Panel    Right thyroid nodule  -     TSH    Mixed hyperlipidemia  -     Comprehensive Metabolic Panel  -     Lipid Panel        Diabetes Mellitus 2 is under poor control.  -with peripheral neuropathy  -A1c 7.4, goal <7  -needs to get A1C <8 for his job  -increase glimepiride 4mg to BID ac. Emphasis on taking AC to avoid hypoglycemia. Pt understands risk of hypoglycemia if he doesn't eat after glimepiride.   -continue trulicity 0.75mg sc weekly, he has occasional mild nausea with this so will not increase dose  -resume Jardiance 25mg QD when ok with urology  -continue metformin 1000mg BID  -check sugar fasting and hs, bring meter/log to all visits      1.   Diet: 3-4 carb servings per meal for females, 4-5 carb servings per meal for males  Spread carb intake throughout the day  Increase lean protein and vegetable intake  Avoid sugary drinks and processed carbs including crackers, cookies, cakes  2.  Exercise: Recommend at least 30 minutes of exercise daily, at least 5 days per week. Increase exercise gradually.   3.  Blood Glucose Goal: Blood glucose goal <150 fasting, <180 2 hr postprandial  4.  Microalbumin due 10/2020  5.  Education performed during this visit: long term diabetic complications discussed. , annual eye examinations at Ophthalmology discussed, dental hygiene discussed  and foot care reviewed., home glucose monitoring emphasized, all medications, side effects and compliance discussed carefully and Hypoglycemia management and prevention reviewed. Reviewed ‘ABCs’ of diabetes management (respective goals in parentheses):  A1C (<7), blood pressure (<130/80), and cholesterol (LDL <100, if CVD <70).    Thyroid nodule  -2.1 cm well-circumscribed isoechoic nodule in the right lobe  -he has an appt with Dr. Marcial next month for repeat u/s and possible bx    Hyperlipidemia  -check lipid panel  -continue zocor    There are no Patient Instructions on file for this visit.    Follow up: Return in about 3 months (around 11/11/2020).    Discussed the nature of the disease including, risks, complications, implications, management, safe and proper use of medications. Encouraged therapeutic lifestyle changes including low calorie diet with plenty of fruits and vegetables, daily exercise, medication compliance, and keeping scheduled follow up appointments with me and any other providers. Encouraged patient to have appointment for complete physical, fasting labs, appropriate screenings, and immunizations on an annual basis.      Signed: Myron Bourne PA-C

## 2020-08-12 LAB
ALBUMIN SERPL-MCNC: 4.3 G/DL (ref 3.5–5.2)
ALBUMIN UR-MCNC: <1.2 MG/DL
ALBUMIN/GLOB SERPL: 1.7 G/DL
ALP SERPL-CCNC: 72 U/L (ref 39–117)
ALT SERPL W P-5'-P-CCNC: 35 U/L (ref 1–41)
ANION GAP SERPL CALCULATED.3IONS-SCNC: 13.6 MMOL/L (ref 5–15)
AST SERPL-CCNC: 20 U/L (ref 1–40)
BILIRUB SERPL-MCNC: 0.4 MG/DL (ref 0–1.2)
BUN SERPL-MCNC: 17 MG/DL (ref 6–20)
BUN/CREAT SERPL: 21.5 (ref 7–25)
CALCIUM SPEC-SCNC: 9.7 MG/DL (ref 8.6–10.5)
CHLORIDE SERPL-SCNC: 97 MMOL/L (ref 98–107)
CHOLEST SERPL-MCNC: 135 MG/DL (ref 0–200)
CO2 SERPL-SCNC: 27.4 MMOL/L (ref 22–29)
CREAT SERPL-MCNC: 0.79 MG/DL (ref 0.76–1.27)
CREAT UR-MCNC: 163.1 MG/DL
GFR SERPL CREATININE-BSD FRML MDRD: 109 ML/MIN/1.73
GLOBULIN UR ELPH-MCNC: 2.5 GM/DL
GLUCOSE SERPL-MCNC: 120 MG/DL (ref 65–99)
HDLC SERPL-MCNC: 39 MG/DL (ref 40–60)
LDLC SERPL CALC-MCNC: 67 MG/DL (ref 0–100)
LDLC/HDLC SERPL: 1.72 {RATIO}
MICROALBUMIN/CREAT UR: NORMAL MG/G{CREAT}
POTASSIUM SERPL-SCNC: 4.4 MMOL/L (ref 3.5–5.2)
PROT SERPL-MCNC: 6.8 G/DL (ref 6–8.5)
SODIUM SERPL-SCNC: 138 MMOL/L (ref 136–145)
TRIGL SERPL-MCNC: 144 MG/DL (ref 0–150)
TSH SERPL DL<=0.05 MIU/L-ACNC: 1.82 UIU/ML (ref 0.27–4.2)
VLDLC SERPL-MCNC: 28.8 MG/DL (ref 5–40)

## 2020-08-13 ENCOUNTER — OFFICE VISIT (OUTPATIENT)
Dept: UROLOGY | Facility: CLINIC | Age: 40
End: 2020-08-13

## 2020-08-13 VITALS
HEIGHT: 74 IN | RESPIRATION RATE: 16 BRPM | HEART RATE: 65 BPM | TEMPERATURE: 97.9 F | OXYGEN SATURATION: 97 % | WEIGHT: 220 LBS | BODY MASS INDEX: 28.23 KG/M2

## 2020-08-13 DIAGNOSIS — N52.9 ERECTILE DYSFUNCTION, UNSPECIFIED ERECTILE DYSFUNCTION TYPE: Primary | ICD-10-CM

## 2020-08-13 PROCEDURE — 99024 POSTOP FOLLOW-UP VISIT: CPT | Performed by: UROLOGY

## 2020-08-13 NOTE — PROGRESS NOTES
Patient presents today for his two-week follow-up for device cycling after insertion of penile prosthesis.  He has been pulling down on his pump daily.  He says the area around the pump is still very sore but denies any pain when going about normal daily activities.  He does not have any fevers or redness around the area.    On physical exam there is a small amount of still edema swelling of the scrotal skin around the pump.  I was unable to cycle the device due to tenderness with manipulation of the pump.  We will have him come back in 2 weeks after doing warm soaks twice daily to soften the skin and reduce the swelling/tenderness so that we can cycle the device.

## 2020-08-28 ENCOUNTER — OFFICE VISIT (OUTPATIENT)
Dept: UROLOGY | Facility: CLINIC | Age: 40
End: 2020-08-28

## 2020-08-28 VITALS
HEART RATE: 70 BPM | DIASTOLIC BLOOD PRESSURE: 60 MMHG | WEIGHT: 220 LBS | BODY MASS INDEX: 28.23 KG/M2 | SYSTOLIC BLOOD PRESSURE: 100 MMHG | OXYGEN SATURATION: 98 % | HEIGHT: 74 IN

## 2020-08-28 DIAGNOSIS — N52.9 ERECTILE DYSFUNCTION, UNSPECIFIED ERECTILE DYSFUNCTION TYPE: Primary | ICD-10-CM

## 2020-08-28 PROCEDURE — 99024 POSTOP FOLLOW-UP VISIT: CPT | Performed by: UROLOGY

## 2020-11-04 ENCOUNTER — TELEPHONE (OUTPATIENT)
Dept: ENDOCRINOLOGY | Facility: CLINIC | Age: 40
End: 2020-11-04

## 2020-11-11 ENCOUNTER — OFFICE VISIT (OUTPATIENT)
Dept: ENDOCRINOLOGY | Facility: CLINIC | Age: 40
End: 2020-11-11

## 2020-11-11 ENCOUNTER — TELEPHONE (OUTPATIENT)
Dept: ENDOCRINOLOGY | Facility: CLINIC | Age: 40
End: 2020-11-11

## 2020-11-11 VITALS
BODY MASS INDEX: 29.34 KG/M2 | OXYGEN SATURATION: 97 % | HEART RATE: 97 BPM | HEIGHT: 74 IN | WEIGHT: 228.6 LBS | TEMPERATURE: 98.2 F | DIASTOLIC BLOOD PRESSURE: 64 MMHG | RESPIRATION RATE: 16 BRPM | SYSTOLIC BLOOD PRESSURE: 110 MMHG

## 2020-11-11 DIAGNOSIS — I10 ESSENTIAL HYPERTENSION: ICD-10-CM

## 2020-11-11 DIAGNOSIS — E11.65 UNCONTROLLED TYPE 2 DIABETES MELLITUS WITH HYPERGLYCEMIA (HCC): Primary | ICD-10-CM

## 2020-11-11 DIAGNOSIS — E78.2 MIXED HYPERLIPIDEMIA: ICD-10-CM

## 2020-11-11 DIAGNOSIS — E04.1 RIGHT THYROID NODULE: ICD-10-CM

## 2020-11-11 LAB
EXPIRATION DATE: ABNORMAL
GLUCOSE BLDC GLUCOMTR-MCNC: 219 MG/DL (ref 70–130)
HBA1C MFR BLD: 8 %
Lab: ABNORMAL

## 2020-11-11 PROCEDURE — 83036 HEMOGLOBIN GLYCOSYLATED A1C: CPT | Performed by: PHYSICIAN ASSISTANT

## 2020-11-11 PROCEDURE — 99214 OFFICE O/P EST MOD 30 MIN: CPT | Performed by: PHYSICIAN ASSISTANT

## 2020-11-11 PROCEDURE — 82947 ASSAY GLUCOSE BLOOD QUANT: CPT | Performed by: PHYSICIAN ASSISTANT

## 2020-11-11 RX ORDER — EMPAGLIFLOZIN 25 MG/1
1 TABLET, FILM COATED ORAL DAILY
Qty: 30 TABLET | Refills: 6 | Status: SHIPPED | OUTPATIENT
Start: 2020-11-11 | End: 2021-02-24 | Stop reason: SDUPTHER

## 2020-11-11 RX ORDER — DULAGLUTIDE 0.75 MG/.5ML
0.75 INJECTION, SOLUTION SUBCUTANEOUS WEEKLY
Qty: 4 PEN | Refills: 6 | Status: CANCELLED | OUTPATIENT
Start: 2020-11-11

## 2020-11-11 RX ORDER — LISINOPRIL 5 MG/1
5 TABLET ORAL DAILY
Qty: 30 TABLET | Refills: 6 | Status: SHIPPED | OUTPATIENT
Start: 2020-11-11 | End: 2021-02-24 | Stop reason: SDUPTHER

## 2020-11-11 RX ORDER — GLIMEPIRIDE 4 MG/1
TABLET ORAL
Qty: 60 TABLET | Refills: 6 | Status: SHIPPED | OUTPATIENT
Start: 2020-11-11 | End: 2021-09-07 | Stop reason: SDUPTHER

## 2020-11-11 RX ORDER — DULAGLUTIDE 1.5 MG/.5ML
1.5 INJECTION, SOLUTION SUBCUTANEOUS WEEKLY
Qty: 4 PEN | Refills: 6 | Status: SHIPPED | OUTPATIENT
Start: 2020-11-11 | End: 2021-02-24 | Stop reason: SDUPTHER

## 2020-11-11 NOTE — PROGRESS NOTES
"Chief Complaint  F/u for Diabetes Mellitus.     HPI   Flaco Florse is a 39 y.o. male who is here today for f/u of Diabetes Mellitus type 2. The initial diagnosis of diabetes was made 2015.    Admits to poor diet.     A1C- 8 (11/11/2020), 7.4 (8/11/2020), 7.8 (5/8/2020), 8.5 (12/13/19)    Labs reviewed:  10/17/19- AC ratio 4, , LDL 92, , TSH 2.68    Diabetic complications: mild peripheral neuropathy  Eye exam current (within one year): due  Foot care and dental care: discussed    Current diabetic medications include:  Metformin 1000mg BID  Jardiance 25mg daily  Glimepiride 4mg BID AC  Trulicity 0.75mg sc weekly     Statin: zocor 40    Past medications: Januvia (insurance did not cover), tradjenta    Diabetic Monitoring  - no meter or log for review. Pt reports fbs ~110-130.     Nutrition:     Current diet: in general, an \"unhealthy\" diet, on average, 2-3 meals per day, no sugared drinks  Current exercise: none    The following portions of the patient's history were reviewed and updated by me as appropriate: allergies, current medications, past family history, past social history, past surgical history and problem list.        Past Medical History:   Diagnosis Date   • Acid reflux    • Chipped tooth     has several in back of mouth   • Depression    • Diabetes (CMS/[facility])    • Diarrhea     medicine induced   • Erectile dysfunction    • History of being tatooed     x1   • Wears glasses        Medications    Current Outpatient Medications:   •  Empagliflozin (Jardiance) 25 MG tablet, Take 25 mg by mouth Daily., Disp: 30 tablet, Rfl: 6  •  FLUoxetine (PROzac) 40 MG capsule, Take 40 mg by mouth Daily., Disp: , Rfl:   •  gabapentin (Neurontin) 300 MG capsule, Take 1 capsule by mouth 3 (Three) Times a Day. Start 5 days before surgery, Disp: 30 capsule, Rfl: 0  •  glimepiride (Amaryl) 4 MG tablet, Take 4mg before breakfast and dinner, Disp: 60 tablet, Rfl: 6  •  lisinopril (PRINIVIL,ZESTRIL) 5 MG tablet, " "Take 1 tablet by mouth Daily., Disp: 30 tablet, Rfl: 6  •  metFORMIN (GLUCOPHAGE) 1000 MG tablet, Take 1 tablet by mouth 2 (Two) Times a Day., Disp: 60 tablet, Rfl: 6  •  omeprazole (priLOSEC) 20 MG capsule, Take 20 mg by mouth Daily., Disp: , Rfl:   •  simvastatin (ZOCOR) 40 MG tablet, Take 40 mg by mouth Every Night., Disp: , Rfl:   •  Dulaglutide (Trulicity) 1.5 MG/0.5ML solution pen-injector, Inject 1.5 mg under the skin into the appropriate area as directed 1 (One) Time Per Week., Disp: 4 pen, Rfl: 6    Review of Systems  Review of Systems   Constitutional: Negative for fatigue.   Cardiovascular: Negative for chest pain.   Endocrine: Negative for polydipsia, polyphagia and polyuria.   Skin: Negative for pallor.   Neurological: Positive for dizziness and headaches. Negative for tremors, seizures, speech difficulty and weakness.   Psychiatric/Behavioral: Negative for confusion. The patient is not nervous/anxious.         Physical Exam    /64   Pulse 97   Temp 98.2 °F (36.8 °C) (Infrared)   Resp 16   Ht 188 cm (74.02\")   Wt 104 kg (228 lb 9.6 oz)   SpO2 97%   BMI 29.34 kg/m² Body mass index is 29.34 kg/m².  Physical Exam   Constitutional: He is oriented to person, place, and time. He appears well-developed. No distress.   HENT:   Head: Normocephalic.   Right Ear: External ear normal.   Left Ear: External ear normal.   Nose: Nose normal.   Eyes: Conjunctivae are normal. Right eye exhibits no discharge. Left eye exhibits no discharge. No scleral icterus.   Neck: Neck supple. No JVD present. No tracheal deviation present. Thyromegaly present.   Cardiovascular: Normal rate, regular rhythm and normal heart sounds.   No murmur heard.  Pulmonary/Chest: Effort normal and breath sounds normal. No respiratory distress. He has no wheezes.   Abdominal: Soft. Bowel sounds are normal. There is no abdominal tenderness.   Musculoskeletal: No tenderness.   Neurological: He is alert and oriented to person, place, and " time.   Skin: Skin is warm and dry. No rash noted. He is not diaphoretic. No erythema.   Psychiatric: His behavior is normal. Judgment and thought content normal.       Labs and Imaging   Lab Results   Component Value Date    HGBA1C 8.0 11/11/2020    HGBA1C 7.4 08/11/2020    HGBA1C 7.80 (H) 06/18/2020     Office Visit on 11/11/2020   Component Date Value Ref Range Status   • Glucose 11/11/2020 219* 70 - 130 mg/dL Final   • Lot Number 11/11/2020 [id number]   Final   • Expiration Date 11/11/2020 06/02/2021   Final   • Hemoglobin A1C 11/11/2020 8.0  % Final       Assessment / Plan   Diagnoses and all orders for this visit:    1. Uncontrolled type 2 diabetes mellitus with hyperglycemia (CMS/[facility]) (Primary)  -     Empagliflozin (Jardiance) 25 MG tablet; Take 25 mg by mouth Daily.  Dispense: 30 tablet; Refill: 6  -     glimepiride (Amaryl) 4 MG tablet; Take 4mg before breakfast and dinner  Dispense: 60 tablet; Refill: 6  -     metFORMIN (GLUCOPHAGE) 1000 MG tablet; Take 1 tablet by mouth 2 (Two) Times a Day.  Dispense: 60 tablet; Refill: 6  -     POC Glucose, Blood  -     POC Glycosylated Hemoglobin (Hb A1C)  -     Dulaglutide (Trulicity) 1.5 MG/0.5ML solution pen-injector; Inject 1.5 mg under the skin into the appropriate area as directed 1 (One) Time Per Week.  Dispense: 4 pen; Refill: 6    2. Right thyroid nodule    3. Mixed hyperlipidemia    4. Essential hypertension  -     lisinopril (PRINIVIL,ZESTRIL) 5 MG tablet; Take 1 tablet by mouth Daily.  Dispense: 30 tablet; Refill: 6    Other orders  -     Cancel: Dulaglutide (Trulicity) 0.75 MG/0.5ML solution pen-injector; Inject 0.75 mg under the skin into the appropriate area as directed 1 (One) Time Per Week.  Dispense: 4 pen; Refill: 6        Diabetes Mellitus 2 is under poor control.  -with peripheral neuropathy  -A1c 8, goal <7  -needs to get A1C <8 for his job  -continue glimepiride 4mg to BID ac. Emphasis on taking AC to avoid hypoglycemia. Pt understands risk of  hypoglycemia if he doesn't eat after glimepiride.   -increase trulicity to 1.5mg sc weekly  -continue Jardiance 25mg QD  -continue metformin 1000mg BID  -check sugar fasting and hs, bring meter/log to all visits  -eye exam is due and pt was encouraged to have this done  -has a1c checked for work in 4 weeks - he will aggressively work on diet to get a1c <8  -discsussed with pt next step is insulin if diabetes remains uncontrolled      1.  Diet: 3-4 carb servings per meal for females, 4-5 carb servings per meal for males  Spread carb intake throughout the day  Increase lean protein and vegetable intake  Avoid sugary drinks and processed carbs including crackers, cookies, cakes  2.  Exercise: Recommend at least 30 minutes of exercise daily, at least 5 days per week. Increase exercise gradually.   3.  Blood Glucose Goal: Blood glucose goal <150 fasting, <180 2 hr postprandial  4.  Microalbumin due 8/2021  5.  Education performed during this visit: long term diabetic complications discussed. , annual eye examinations at Ophthalmology discussed, dental hygiene discussed  and foot care reviewed., home glucose monitoring emphasized, all medications, side effects and compliance discussed carefully and Hypoglycemia management and prevention reviewed. Reviewed ‘ABCs’ of diabetes management (respective goals in parentheses):  A1C (<7), blood pressure (<130/80), and cholesterol (LDL <100, if CVD <70).    Thyroid nodule  -2.1 cm well-circumscribed isoechoic nodule in the right lobe  -pt cancelled appt with Dr. Marcial 9/2/2020 which was for repeat thyroid u/s and possible bx- will reschedule    Hyperlipidemia  -lipid panel utd 8/2020  -continue zocor    HTN  -controlled  -refill lisinopril 5 mg daily    There are no Patient Instructions on file for this visit.    Follow up: Return in about 3 months (around 2/11/2021).    Discussed the nature of the disease including, risks, complications, implications, management, safe and proper  use of medications. Encouraged therapeutic lifestyle changes including low calorie diet with plenty of fruits and vegetables, daily exercise, medication compliance, and keeping scheduled follow up appointments with me and any other providers. Encouraged patient to have appointment for complete physical, fasting labs, appropriate screenings, and immunizations on an annual basis.      Signed: Myron Bourne PA-C

## 2020-11-13 ENCOUNTER — PRIOR AUTHORIZATION (OUTPATIENT)
Dept: ENDOCRINOLOGY | Facility: CLINIC | Age: 40
End: 2020-11-13

## 2020-11-24 PROCEDURE — U0004 COV-19 TEST NON-CDC HGH THRU: HCPCS | Performed by: NURSE PRACTITIONER

## 2021-01-26 ENCOUNTER — TELEPHONE (OUTPATIENT)
Dept: ENDOCRINOLOGY | Facility: CLINIC | Age: 41
End: 2021-01-26

## 2021-01-26 ENCOUNTER — PRIOR AUTHORIZATION (OUTPATIENT)
Dept: ENDOCRINOLOGY | Facility: CLINIC | Age: 41
End: 2021-01-26

## 2021-01-26 NOTE — TELEPHONE ENCOUNTER
Pt informed that both PA s had been submitted and that he could check back with his pharmacy to see if they go through

## 2021-01-26 NOTE — TELEPHONE ENCOUNTER
PATIENT CALLED AND SAID PHARMACY WILL NOT FILL HIS TRULICITY OR JARDIANCE. HE WAS GOING TO CONTACT THEM TO SEE IF A PA WAS NEEDED - HIS INSURANCE RECENTLY CHANGED. I INFORMED THE PATIENT THAT IF SO, WE WOULD NEED A DENIAL BEFORE WE COULD BEGIN THE PA PROCESS. PT WAS GOING TO CONTACT HIS PHARMACY.

## 2021-02-24 ENCOUNTER — OFFICE VISIT (OUTPATIENT)
Dept: ENDOCRINOLOGY | Facility: CLINIC | Age: 41
End: 2021-02-24

## 2021-02-24 VITALS
DIASTOLIC BLOOD PRESSURE: 80 MMHG | WEIGHT: 220.8 LBS | SYSTOLIC BLOOD PRESSURE: 120 MMHG | BODY MASS INDEX: 28.35 KG/M2 | HEART RATE: 85 BPM | OXYGEN SATURATION: 98 %

## 2021-02-24 DIAGNOSIS — E78.2 MIXED HYPERLIPIDEMIA: Chronic | ICD-10-CM

## 2021-02-24 DIAGNOSIS — I10 ESSENTIAL HYPERTENSION: ICD-10-CM

## 2021-02-24 DIAGNOSIS — E11.65 UNCONTROLLED TYPE 2 DIABETES MELLITUS WITH HYPERGLYCEMIA (HCC): Primary | Chronic | ICD-10-CM

## 2021-02-24 DIAGNOSIS — E04.1 RIGHT THYROID NODULE: Chronic | ICD-10-CM

## 2021-02-24 LAB
GLUCOSE BLDC GLUCOMTR-MCNC: 235 MG/DL (ref 70–130)
HBA1C MFR BLD: 8.5 %

## 2021-02-24 PROCEDURE — 99214 OFFICE O/P EST MOD 30 MIN: CPT | Performed by: PHYSICIAN ASSISTANT

## 2021-02-24 PROCEDURE — 82947 ASSAY GLUCOSE BLOOD QUANT: CPT | Performed by: PHYSICIAN ASSISTANT

## 2021-02-24 PROCEDURE — 83036 HEMOGLOBIN GLYCOSYLATED A1C: CPT | Performed by: PHYSICIAN ASSISTANT

## 2021-02-24 RX ORDER — SIMVASTATIN 40 MG
40 TABLET ORAL NIGHTLY
Qty: 30 TABLET | Refills: 6 | Status: SHIPPED | OUTPATIENT
Start: 2021-02-24 | End: 2021-09-07 | Stop reason: SDUPTHER

## 2021-02-24 RX ORDER — DULAGLUTIDE 1.5 MG/.5ML
1.5 INJECTION, SOLUTION SUBCUTANEOUS WEEKLY
Qty: 2 ML | Refills: 6 | Status: SHIPPED | OUTPATIENT
Start: 2021-02-24 | End: 2021-05-19 | Stop reason: SDUPTHER

## 2021-02-24 RX ORDER — EMPAGLIFLOZIN 25 MG/1
1 TABLET, FILM COATED ORAL DAILY
Qty: 30 TABLET | Refills: 6 | Status: SHIPPED | OUTPATIENT
Start: 2021-02-24 | End: 2021-09-07 | Stop reason: SDUPTHER

## 2021-02-24 RX ORDER — LISINOPRIL 5 MG/1
5 TABLET ORAL DAILY
Qty: 30 TABLET | Refills: 6 | Status: SHIPPED | OUTPATIENT
Start: 2021-02-24 | End: 2021-09-07 | Stop reason: SDUPTHER

## 2021-02-24 NOTE — PROGRESS NOTES
"Chief Complaint  F/u for Diabetes Mellitus.     HPI   Flaco Flores is a 40 y.o. male who is here today for f/u of Diabetes Mellitus type 2. The initial diagnosis of diabetes was made 2015.    Admits to poor diet. Has not been taking meds regularly.  Going through a divorce. Staying with a coworker currently.     A1C- 8 (11/11/2020), 7.4 (8/11/2020), 7.8 (5/8/2020), 8.5 (12/13/19)    Labs reviewed:  10/17/19- AC ratio 4, , LDL 92, , TSH 2.68    Diabetic complications: mild peripheral neuropathy  Eye exam current (within one year): due  Foot care and dental care: discussed    Current diabetic medications include:  Metformin 1000mg BID  Jardiance 25mg daily  Glimepiride 4mg BID AC  Trulicity 1.5mg sc weekly - mild nausea    Statin: zocor 40    Past medications: Januvia (insurance did not cover), tradjenta    Diabetic Monitoring  - no meter or log for review. Pt reports fbs ~110-130.     Nutrition:     Current diet: in general, an \"unhealthy\" diet, on average, 2-3 meals per day, no sugared drinks  Current exercise: none    The following portions of the patient's history were reviewed and updated by me as appropriate: allergies, current medications, past family history, past social history, past surgical history and problem list.        Past Medical History:   Diagnosis Date   • Acid reflux    • Chipped tooth     has several in back of mouth   • Depression    • Diabetes (CMS/[facility])    • Diarrhea     medicine induced   • Erectile dysfunction    • History of being tatooed     x1   • Wears glasses        Medications    Current Outpatient Medications:   •  Dulaglutide (Trulicity) 1.5 MG/0.5ML solution pen-injector, Inject 1.5 mg under the skin into the appropriate area as directed 1 (One) Time Per Week., Disp: 2 mL, Rfl: 6  •  Empagliflozin (Jardiance) 25 MG tablet, Take 25 mg by mouth Daily., Disp: 30 tablet, Rfl: 6  •  FLUoxetine (PROzac) 40 MG capsule, Take 40 mg by mouth Daily., Disp: , Rfl:   •  " glimepiride (Amaryl) 4 MG tablet, Take 4mg before breakfast and dinner, Disp: 60 tablet, Rfl: 6  •  lisinopril (PRINIVIL,ZESTRIL) 5 MG tablet, Take 1 tablet by mouth Daily., Disp: 30 tablet, Rfl: 6  •  metFORMIN (GLUCOPHAGE) 1000 MG tablet, Take 1 tablet by mouth 2 (Two) Times a Day., Disp: 60 tablet, Rfl: 6  •  omeprazole (priLOSEC) 20 MG capsule, Take 20 mg by mouth Daily., Disp: , Rfl:   •  simvastatin (ZOCOR) 40 MG tablet, Take 1 tablet by mouth Every Night., Disp: 30 tablet, Rfl: 6    Review of Systems  Review of Systems   Constitutional: Negative for fatigue.   Cardiovascular: Negative for chest pain.   Endocrine: Negative for polydipsia, polyphagia and polyuria.   Skin: Negative for pallor.   Neurological: Positive for dizziness and headaches. Negative for tremors, seizures, speech difficulty and weakness.   Psychiatric/Behavioral: Negative for confusion. The patient is not nervous/anxious.         Physical Exam    /80   Pulse 85   Wt 100 kg (220 lb 12.8 oz)   SpO2 98%   BMI 28.35 kg/m² Body mass index is 28.35 kg/m².  Physical Exam   Constitutional: He is oriented to person, place, and time. He appears well-developed. No distress.   HENT:   Head: Normocephalic.   Right Ear: External ear normal.   Left Ear: External ear normal.   Nose: Nose normal.   Eyes: Conjunctivae are normal. Right eye exhibits no discharge. Left eye exhibits no discharge. No scleral icterus.   Neck: Neck supple. No JVD present. No tracheal deviation present. Mass: right thyroid nodule.   Cardiovascular: Normal rate, regular rhythm and normal heart sounds.   No murmur heard.  Pulmonary/Chest: Effort normal and breath sounds normal. No respiratory distress. He has no wheezes.   Abdominal: Soft. Bowel sounds are normal. There is no abdominal tenderness.   Musculoskeletal: No tenderness.    Flaco had a diabetic foot exam performed today.   During the foot exam he had a monofilament test performed.    Neurological Sensory  Findings - Unaltered hot/cold right ankle/foot discrimination and unaltered hot/cold left ankle/foot discrimination. Unaltered sharp/dull right ankle/foot discrimination and unaltered sharp/dull left ankle/foot discrimination. No right ankle/foot altered proprioception and no left ankle/foot altered proprioception  Vascular Status -  His right foot exhibits normal foot vasculature  and no edema. His left foot exhibits normal foot vasculature  and no edema.  Skin Integrity  -  His right foot skin is intact.  He has no right foot onychomycosis, no right foot ulcer and non-callous right foot.His left foot skin is intact. He has no left foot onychomycosis, no left foot ulcer and non-callous left foot..  Neurological: He is alert and oriented to person, place, and time.   Skin: Skin is warm and dry. No rash noted. He is not diaphoretic. No erythema.   Psychiatric: His behavior is normal. Judgment and thought content normal.       Labs and Imaging   Lab Results   Component Value Date    HGBA1C 8.5 02/24/2021    HGBA1C 8.0 11/11/2020    HGBA1C 7.4 08/11/2020     Office Visit on 02/24/2021   Component Date Value Ref Range Status   • Glucose 02/24/2021 235* 70 - 130 mg/dL Final   • Hemoglobin A1C 02/24/2021 8.5  % Final     US Thyroid (05/20/2020 16:52)      Assessment / Plan   Diagnoses and all orders for this visit:    1. Uncontrolled type 2 diabetes mellitus with hyperglycemia (CMS/[facility]) (Primary)  -     POC Glucose, Blood  -     POC Glycosylated Hemoglobin (Hb A1C)  -     Dulaglutide (Trulicity) 1.5 MG/0.5ML solution pen-injector; Inject 1.5 mg under the skin into the appropriate area as directed 1 (One) Time Per Week.  Dispense: 2 mL; Refill: 6  -     metFORMIN (GLUCOPHAGE) 1000 MG tablet; Take 1 tablet by mouth 2 (Two) Times a Day.  Dispense: 60 tablet; Refill: 6  -     Empagliflozin (Jardiance) 25 MG tablet; Take 25 mg by mouth Daily.  Dispense: 30 tablet; Refill: 6    2. Essential hypertension  -     lisinopril  (PRINIVIL,ZESTRIL) 5 MG tablet; Take 1 tablet by mouth Daily.  Dispense: 30 tablet; Refill: 6    3. Mixed hyperlipidemia  -     simvastatin (ZOCOR) 40 MG tablet; Take 1 tablet by mouth Every Night.  Dispense: 30 tablet; Refill: 6    4. Right thyroid nodule        Diabetes Mellitus 2 is under poor control.  -with peripheral neuropathy  -A1c 8.5, goal <7  -needs to get A1C <8 for his job  -BG control worsened with increase stress recently due to a divorce. He is currently living with a coworker. Admits to poor diet and not taking his medications regularly. He understands to work on these things in order to get BG under better control. We have discussed in the past multiple times if we can't get a1c <7 with current regimen we would need to start insulin  -continue glimepiride 4mg to BID ac. Emphasis on taking AC to avoid hypoglycemia. Pt understands risk of hypoglycemia if he doesn't eat after glimepiride.   -continue trulicity 1.5mg sc weekly. Provided samples of 0.75 mg to take x2 weeks since he has not been taking the 1.5 mg regularly.   -continue Jardiance 25mg QD  -continue metformin 1000mg BID  -check sugar fasting and hs, bring meter/log to all visits  -eye exam is due- declines referral       1.  Diet: 3-4 carb servings per meal for females, 4-5 carb servings per meal for males  Spread carb intake throughout the day  Increase lean protein and vegetable intake  Avoid sugary drinks and processed carbs including crackers, cookies, cakes  2.  Exercise: Recommend at least 30 minutes of exercise daily, at least 5 days per week. Increase exercise gradually.   3.  Blood Glucose Goal: Blood glucose goal <150 fasting, <180 2 hr postprandial  4.  Microalbumin due 8/2021  5.  Education performed during this visit: long term diabetic complications discussed. , annual eye examinations at Ophthalmology discussed, dental hygiene discussed  and foot care reviewed., home glucose monitoring emphasized, all medications, side  effects and compliance discussed carefully and Hypoglycemia management and prevention reviewed. Reviewed ‘ABCs’ of diabetes management (respective goals in parentheses):  A1C (<7), blood pressure (<130/80), and cholesterol (LDL <100, if CVD <70).    Thyroid nodule  -2.1 cm well-circumscribed isoechoic nodule in the right lobe  -pt cancelled appt with Dr. Marcial 9/2/2020 which was for repeat thyroid u/s and possible bx, it has been rescheduled for 3/2/2021- encouraged to keep appt    Hyperlipidemia  -lipid panel utd 8/2020  -continue zocor    HTN  -controlled  -refill lisinopril 5 mg daily    There are no Patient Instructions on file for this visit.    Follow up: Return in about 3 months (around 5/24/2021).    Discussed the nature of the disease including, risks, complications, implications, management, safe and proper use of medications. Encouraged therapeutic lifestyle changes including low calorie diet with plenty of fruits and vegetables, daily exercise, medication compliance, and keeping scheduled follow up appointments with me and any other providers. Encouraged patient to have appointment for complete physical, fasting labs, appropriate screenings, and immunizations on an annual basis.      Signed: Myron Bourne PA-C

## 2021-05-19 DIAGNOSIS — E11.65 UNCONTROLLED TYPE 2 DIABETES MELLITUS WITH HYPERGLYCEMIA (HCC): Chronic | ICD-10-CM

## 2021-05-19 RX ORDER — DULAGLUTIDE 1.5 MG/.5ML
1.5 INJECTION, SOLUTION SUBCUTANEOUS WEEKLY
Qty: 6 ML | Refills: 1 | Status: SHIPPED | OUTPATIENT
Start: 2021-05-19 | End: 2021-07-09 | Stop reason: SDUPTHER

## 2021-05-19 NOTE — TELEPHONE ENCOUNTER
PHARM CALLED REQUESTING A REFILL OF TRULICITY TO BE SENT IN TO [facility]. FAX # [phone]      PLEASE AND THANK YOU

## 2021-05-26 ENCOUNTER — OFFICE VISIT (OUTPATIENT)
Dept: ENDOCRINOLOGY | Facility: CLINIC | Age: 41
End: 2021-05-26

## 2021-05-26 VITALS
WEIGHT: 221.6 LBS | BODY MASS INDEX: 28.44 KG/M2 | HEART RATE: 82 BPM | DIASTOLIC BLOOD PRESSURE: 80 MMHG | SYSTOLIC BLOOD PRESSURE: 118 MMHG | HEIGHT: 74 IN | OXYGEN SATURATION: 97 %

## 2021-05-26 DIAGNOSIS — E78.2 MIXED HYPERLIPIDEMIA: Chronic | ICD-10-CM

## 2021-05-26 DIAGNOSIS — I10 ESSENTIAL HYPERTENSION: Chronic | ICD-10-CM

## 2021-05-26 DIAGNOSIS — E04.1 RIGHT THYROID NODULE: Chronic | ICD-10-CM

## 2021-05-26 DIAGNOSIS — E11.65 UNCONTROLLED TYPE 2 DIABETES MELLITUS WITH HYPERGLYCEMIA (HCC): Primary | Chronic | ICD-10-CM

## 2021-05-26 LAB
GLUCOSE BLDC GLUCOMTR-MCNC: 148 MG/DL (ref 70–130)
HBA1C MFR BLD: 6.2 %

## 2021-05-26 PROCEDURE — 82947 ASSAY GLUCOSE BLOOD QUANT: CPT | Performed by: PHYSICIAN ASSISTANT

## 2021-05-26 PROCEDURE — 99214 OFFICE O/P EST MOD 30 MIN: CPT | Performed by: PHYSICIAN ASSISTANT

## 2021-05-26 PROCEDURE — 83036 HEMOGLOBIN GLYCOSYLATED A1C: CPT | Performed by: PHYSICIAN ASSISTANT

## 2021-05-26 NOTE — PROGRESS NOTES
"Chief Complaint  F/u for Diabetes Mellitus.     HPI   Flaco Flores is a 40 y.o. male who is here today for f/u of Diabetes Mellitus type 2. The initial diagnosis of diabetes was made 2015.    Has improved diet. Now living in his own house after divorce.   Taking meds regularly again.     A1C- 6.2 (5/26/2021), 8 (11/11/2020), 7.4 (8/11/2020), 7.8 (5/8/2020), 8.5 (12/13/19)    Diabetic complications: mild peripheral neuropathy  Eye exam current (within one year): due  Foot care and dental care: discussed    Current diabetic medications include:  Metformin 1000mg BID  Jardiance 25mg daily  Glimepiride 4mg BID AC  Trulicity 1.5mg sc weekly - mild nausea    Statin: zocor 40    Past medications: Januvia (insurance did not cover), tradjenta    Diabetic Monitoring  - no meter or log for review. Denies symptoms of hypoglycemia.    Nutrition:     Current diet: in general, an \"unhealthy\" diet, on average, 2-3 meals per day, no sugared drinks  Current exercise: none    The following portions of the patient's history were reviewed and updated by me as appropriate: allergies, current medications, past family history, past social history, past surgical history and problem list.      Past Medical History:   Diagnosis Date   • Acid reflux    • Chipped tooth     has several in back of mouth   • Depression    • Diabetes (CMS/[facility])    • Diarrhea     medicine induced   • Erectile dysfunction    • History of being tatooed     x1   • Wears glasses        Medications    Current Outpatient Medications:   •  Dulaglutide (Trulicity) 1.5 MG/0.5ML solution pen-injector, Inject 1.5 mg under the skin into the appropriate area as directed 1 (One) Time Per Week., Disp: 6 mL, Rfl: 1  •  Empagliflozin (Jardiance) 25 MG tablet, Take 25 mg by mouth Daily., Disp: 30 tablet, Rfl: 6  •  FLUoxetine (PROzac) 40 MG capsule, Take 40 mg by mouth Daily., Disp: , Rfl:   •  glimepiride (Amaryl) 4 MG tablet, Take 4mg before breakfast and dinner, Disp: 60 " "tablet, Rfl: 6  •  lisinopril (PRINIVIL,ZESTRIL) 5 MG tablet, Take 1 tablet by mouth Daily., Disp: 30 tablet, Rfl: 6  •  metFORMIN (GLUCOPHAGE) 1000 MG tablet, Take 1 tablet by mouth 2 (Two) Times a Day., Disp: 60 tablet, Rfl: 6  •  omeprazole (priLOSEC) 20 MG capsule, Take 20 mg by mouth Daily., Disp: , Rfl:   •  simvastatin (ZOCOR) 40 MG tablet, Take 1 tablet by mouth Every Night., Disp: 30 tablet, Rfl: 6    Review of Systems  Review of Systems   Constitutional: Negative for fatigue.   Cardiovascular: Negative for chest pain.   Endocrine: Negative for polydipsia, polyphagia and polyuria.   Skin: Negative for pallor.   Neurological: Positive for dizziness and headaches. Negative for tremors, seizures, speech difficulty and weakness.   Psychiatric/Behavioral: Negative for confusion. The patient is not nervous/anxious.         Physical Exam    /80   Pulse 82   Ht 188 cm (74\")   Wt 101 kg (221 lb 9.6 oz)   SpO2 97%   BMI 28.45 kg/m² Body mass index is 28.45 kg/m².  Physical Exam   Constitutional: He is oriented to person, place, and time. He appears well-developed. No distress.   HENT:   Head: Normocephalic.   Right Ear: External ear normal.   Left Ear: External ear normal.   Nose: Nose normal.   Eyes: Conjunctivae are normal. Right eye exhibits no discharge. Left eye exhibits no discharge. No scleral icterus.   Neck: No JVD present. No tracheal deviation present. No thyromegaly present.   Cardiovascular: Normal rate, regular rhythm and normal heart sounds.   No murmur heard.  Pulmonary/Chest: Effort normal and breath sounds normal. No respiratory distress. He has no wheezes.   Abdominal: Soft. Bowel sounds are normal. There is no abdominal tenderness.   Musculoskeletal: No tenderness.   Neurological: He is alert and oriented to person, place, and time.   Skin: Skin is warm and dry. No rash noted. He is not diaphoretic. No erythema.   Psychiatric: His behavior is normal. Judgment and thought content normal. "       Labs and Imaging   Lab Results   Component Value Date    HGBA1C 6.2 05/26/2021    HGBA1C 8.5 02/24/2021    HGBA1C 8.0 11/11/2020     Office Visit on 05/26/2021   Component Date Value Ref Range Status   • Glucose 05/26/2021 148* 70 - 130 mg/dL Final   • Hemoglobin A1C 05/26/2021 6.2  % Final     US Thyroid (05/20/2020 16:52)      Assessment / Plan   Diagnoses and all orders for this visit:    1. Uncontrolled type 2 diabetes mellitus with hyperglycemia (CMS/HCC) (Primary)  -     POC Glucose, Blood  -     POC Glycosylated Hemoglobin (Hb A1C)    2. Right thyroid nodule  -     US Thyroid; Future    3. Mixed hyperlipidemia    4. Essential hypertension        Diabetes Mellitus 2 is under poor control.  -with peripheral neuropathy  -A1c 6.2, down from 8.5 last visit  -continue glimepiride 4mg BID AC  -continue trulicity 1.5mg sc weekly  -continue Jardiance 25mg QD  -continue metformin 1000mg BID  -eye exam is due  -fasting labs next visit  -food exam was updated 2/2021      1.  Diet: 3-4 carb servings per meal for females, 4-5 carb servings per meal for males  Spread carb intake throughout the day  Increase lean protein and vegetable intake  Avoid sugary drinks and processed carbs including crackers, cookies, cakes  2.  Exercise: Recommend at least 30 minutes of exercise daily, at least 5 days per week. Increase exercise gradually.   3.  Blood Glucose Goal: Blood glucose goal <150 fasting, <180 2 hr postprandial  4.  Microalbumin due 8/2021  5.  Education performed during this visit: long term diabetic complications discussed. , annual eye examinations at Ophthalmology discussed, dental hygiene discussed  and foot care reviewed., home glucose monitoring emphasized, all medications, side effects and compliance discussed carefully and Hypoglycemia management and prevention reviewed. Reviewed ‘ABCs’ of diabetes management (respective goals in parentheses):  A1C (<7), blood pressure (<130/80), and cholesterol (LDL <100,  if CVD <70).    Thyroid nodule  -2.1 cm well-circumscribed isoechoic nodule in the right lobe  -pt cancelled appt with Dr. Marcial 9/2/2020 which was for repeat thyroid u/s and possible bx, it was rescheduled for 3/2/2021 and pt was no show for that appt  -repeat thyroid u/s    Hyperlipidemia  -lipid panel utd 8/2020  -continue zocor  -fasting labs next visit    HTN  -controlled  -refill lisinopril 5 mg daily    There are no Patient Instructions on file for this visit.    Follow up: Return in about 3 months (around 8/26/2021).    Discussed the nature of the disease including, risks, complications, implications, management, safe and proper use of medications. Encouraged therapeutic lifestyle changes including low calorie diet with plenty of fruits and vegetables, daily exercise, medication compliance, and keeping scheduled follow up appointments with me and any other providers. Encouraged patient to have appointment for complete physical, fasting labs, appropriate screenings, and immunizations on an annual basis.      Signed: Myron Bourne PA-C

## 2021-07-09 DIAGNOSIS — E11.65 UNCONTROLLED TYPE 2 DIABETES MELLITUS WITH HYPERGLYCEMIA (HCC): Chronic | ICD-10-CM

## 2021-07-09 RX ORDER — DULAGLUTIDE 1.5 MG/.5ML
1.5 INJECTION, SOLUTION SUBCUTANEOUS WEEKLY
Qty: 6 ML | Refills: 1 | Status: SHIPPED | OUTPATIENT
Start: 2021-07-09 | End: 2022-01-17 | Stop reason: SDUPTHER

## 2021-08-11 ENCOUNTER — TELEPHONE (OUTPATIENT)
Dept: ENDOCRINOLOGY | Facility: CLINIC | Age: 41
End: 2021-08-11

## 2021-08-11 NOTE — TELEPHONE ENCOUNTER
Patient called and said he needs a refill on FLUoxetine (PROzac) 40 MG capsule and omeprazole (priLOSEC) 20 MG capsule.

## 2021-08-11 NOTE — TELEPHONE ENCOUNTER
Attempted to contact him to let him know that these medications need to be refilled by his PCP but I was unable to reach him or leave a message.

## 2021-08-13 NOTE — TELEPHONE ENCOUNTER
Left a message on his voicemail letting him know that he needed to contact his PCP for refills of these medications.

## 2021-09-07 ENCOUNTER — OFFICE VISIT (OUTPATIENT)
Dept: ENDOCRINOLOGY | Facility: CLINIC | Age: 41
End: 2021-09-07

## 2021-09-07 ENCOUNTER — LAB (OUTPATIENT)
Dept: LAB | Facility: HOSPITAL | Age: 41
End: 2021-09-07

## 2021-09-07 VITALS
BODY MASS INDEX: 28.26 KG/M2 | WEIGHT: 220.2 LBS | HEART RATE: 100 BPM | OXYGEN SATURATION: 98 % | HEIGHT: 74 IN | DIASTOLIC BLOOD PRESSURE: 80 MMHG | SYSTOLIC BLOOD PRESSURE: 122 MMHG

## 2021-09-07 DIAGNOSIS — E04.1 RIGHT THYROID NODULE: Chronic | ICD-10-CM

## 2021-09-07 DIAGNOSIS — I10 ESSENTIAL HYPERTENSION: Chronic | ICD-10-CM

## 2021-09-07 DIAGNOSIS — E78.2 MIXED HYPERLIPIDEMIA: Chronic | ICD-10-CM

## 2021-09-07 DIAGNOSIS — E11.65 UNCONTROLLED TYPE 2 DIABETES MELLITUS WITH HYPERGLYCEMIA (HCC): Primary | Chronic | ICD-10-CM

## 2021-09-07 LAB
ALBUMIN SERPL-MCNC: 4.3 G/DL (ref 3.5–5.2)
ALBUMIN UR-MCNC: <1.2 MG/DL
ALBUMIN/GLOB SERPL: 1.5 G/DL
ALP SERPL-CCNC: 79 U/L (ref 39–117)
ALT SERPL W P-5'-P-CCNC: 22 U/L (ref 1–41)
ANION GAP SERPL CALCULATED.3IONS-SCNC: 11.6 MMOL/L (ref 5–15)
AST SERPL-CCNC: 21 U/L (ref 1–40)
BILIRUB SERPL-MCNC: 0.4 MG/DL (ref 0–1.2)
BUN SERPL-MCNC: 13 MG/DL (ref 6–20)
BUN/CREAT SERPL: 14.3 (ref 7–25)
CALCIUM SPEC-SCNC: 9.3 MG/DL (ref 8.6–10.5)
CHLORIDE SERPL-SCNC: 101 MMOL/L (ref 98–107)
CHOLEST SERPL-MCNC: 137 MG/DL (ref 0–200)
CO2 SERPL-SCNC: 24.4 MMOL/L (ref 22–29)
CREAT SERPL-MCNC: 0.91 MG/DL (ref 0.76–1.27)
CREAT UR-MCNC: 82.8 MG/DL
GFR SERPL CREATININE-BSD FRML MDRD: 92 ML/MIN/1.73
GLOBULIN UR ELPH-MCNC: 2.8 GM/DL
GLUCOSE BLDC GLUCOMTR-MCNC: 190 MG/DL (ref 70–130)
GLUCOSE SERPL-MCNC: 201 MG/DL (ref 65–99)
HBA1C MFR BLD: 8.4 %
HDLC SERPL-MCNC: 35 MG/DL (ref 40–60)
LDLC SERPL CALC-MCNC: 73 MG/DL (ref 0–100)
LDLC/HDLC SERPL: 1.96 {RATIO}
MICROALBUMIN/CREAT UR: NORMAL MG/G{CREAT}
POTASSIUM SERPL-SCNC: 4.1 MMOL/L (ref 3.5–5.2)
PROT SERPL-MCNC: 7.1 G/DL (ref 6–8.5)
SODIUM SERPL-SCNC: 137 MMOL/L (ref 136–145)
TRIGL SERPL-MCNC: 167 MG/DL (ref 0–150)
TSH SERPL DL<=0.05 MIU/L-ACNC: 1.72 UIU/ML (ref 0.27–4.2)
VLDLC SERPL-MCNC: 29 MG/DL (ref 5–40)

## 2021-09-07 PROCEDURE — 82947 ASSAY GLUCOSE BLOOD QUANT: CPT | Performed by: PHYSICIAN ASSISTANT

## 2021-09-07 PROCEDURE — 99214 OFFICE O/P EST MOD 30 MIN: CPT | Performed by: PHYSICIAN ASSISTANT

## 2021-09-07 PROCEDURE — 80053 COMPREHEN METABOLIC PANEL: CPT | Performed by: PHYSICIAN ASSISTANT

## 2021-09-07 PROCEDURE — 80061 LIPID PANEL: CPT | Performed by: PHYSICIAN ASSISTANT

## 2021-09-07 PROCEDURE — 82570 ASSAY OF URINE CREATININE: CPT | Performed by: PHYSICIAN ASSISTANT

## 2021-09-07 PROCEDURE — 84443 ASSAY THYROID STIM HORMONE: CPT | Performed by: PHYSICIAN ASSISTANT

## 2021-09-07 PROCEDURE — 82043 UR ALBUMIN QUANTITATIVE: CPT | Performed by: PHYSICIAN ASSISTANT

## 2021-09-07 RX ORDER — GLIMEPIRIDE 4 MG/1
TABLET ORAL
Qty: 180 TABLET | Refills: 1 | Status: SHIPPED | OUTPATIENT
Start: 2021-09-07 | End: 2022-01-17 | Stop reason: SDUPTHER

## 2021-09-07 RX ORDER — SIMVASTATIN 40 MG
40 TABLET ORAL NIGHTLY
Qty: 90 TABLET | Refills: 1 | Status: SHIPPED | OUTPATIENT
Start: 2021-09-07 | End: 2022-01-17 | Stop reason: SDUPTHER

## 2021-09-07 RX ORDER — LISINOPRIL 5 MG/1
5 TABLET ORAL DAILY
Qty: 90 TABLET | Refills: 1 | Status: SHIPPED | OUTPATIENT
Start: 2021-09-07 | End: 2022-01-17 | Stop reason: SDUPTHER

## 2021-09-07 NOTE — PROGRESS NOTES
Chief Complaint  F/u for Diabetes Mellitus.     HPI   Flaco Flores is a 40 y.o. male who is here today for f/u of Diabetes Mellitus type 2. The initial diagnosis of diabetes was made 2015.     Has gone through a rough time financially. Food budget very low therefore unable to eat well. This is starting to improve. Reports he knew A1C would be up. Will work on this.   Has f/u scheduled with Dr. Marcial for repeat thyroid u/s +/- biopsy of right thyroid nodule.     A1C- 8.4 (9/7/2021), 6.2 (5/26/2021), 8 (11/11/2020), 7.4 (8/11/2020), 7.8 (5/8/2020), 8.5 (12/13/19)    Diabetic complications: mild peripheral neuropathy  Eye exam current (within one year): due  Foot care and dental care: discussed    Current diabetic medications include:  Metformin 1000mg BID  Jardiance 25mg daily  Glimepiride 4mg BID AC  Trulicity 1.5mg sc weekly - mild nausea    Statin: zocor 40    Past medications: Januvia (insurance did not cover), tradjenta    Diabetic Monitoring  - no meter or log for review. Denies symptoms of hypoglycemia.    The following portions of the patient's history were reviewed and updated by me as appropriate: allergies, current medications, past family history, past social history, past surgical history and problem list.      Past Medical History:   Diagnosis Date   • Acid reflux    • Chipped tooth     has several in back of mouth   • Depression    • Diabetes (CMS/[facility])    • Diarrhea     medicine induced   • Erectile dysfunction    • History of being tatooed     x1   • Wears glasses        Medications    Current Outpatient Medications:   •  Dulaglutide (Trulicity) 1.5 MG/0.5ML solution pen-injector, Inject 1.5 mg under the skin into the appropriate area as directed 1 (One) Time Per Week., Disp: 6 mL, Rfl: 1  •  empagliflozin (Jardiance) 25 MG tablet tablet, Take 1 tablet by mouth Daily., Disp: 90 tablet, Rfl: 1  •  FLUoxetine (PROzac) 40 MG capsule, Take 40 mg by mouth Daily., Disp: , Rfl:   •  glimepiride  "(Amaryl) 4 MG tablet, Take 4mg before breakfast and dinner, Disp: 180 tablet, Rfl: 1  •  lisinopril (PRINIVIL,ZESTRIL) 5 MG tablet, Take 1 tablet by mouth Daily., Disp: 90 tablet, Rfl: 1  •  metFORMIN (GLUCOPHAGE) 1000 MG tablet, Take 1 tablet by mouth 2 (Two) Times a Day., Disp: 180 tablet, Rfl: 1  •  omeprazole (priLOSEC) 20 MG capsule, Take 20 mg by mouth Daily., Disp: , Rfl:   •  simvastatin (ZOCOR) 40 MG tablet, Take 1 tablet by mouth Every Night., Disp: 90 tablet, Rfl: 1    Review of Systems  Review of Systems   Constitutional: Negative for fatigue.   Cardiovascular: Negative for chest pain.   Endocrine: Negative for polydipsia, polyphagia and polyuria.   Skin: Negative for pallor.   Neurological: Positive for dizziness and headaches. Negative for tremors, seizures, speech difficulty and weakness.   Psychiatric/Behavioral: Negative for confusion. The patient is not nervous/anxious.         Physical Exam    /80   Pulse 100   Ht 188 cm (74\")   Wt 99.9 kg (220 lb 3.2 oz)   SpO2 98%   BMI 28.27 kg/m² Body mass index is 28.27 kg/m².  Physical Exam   Constitutional: He is oriented to person, place, and time. He appears well-developed. No distress.   HENT:   Head: Normocephalic.   Right Ear: External ear normal.   Left Ear: External ear normal.   Nose: Nose normal.   Eyes: Conjunctivae are normal. Right eye exhibits no discharge. Left eye exhibits no discharge. No scleral icterus.   Neck: No JVD present. No tracheal deviation present. No thyromegaly present.   Cardiovascular: Normal rate, regular rhythm and normal heart sounds.   No murmur heard.  Pulmonary/Chest: Effort normal and breath sounds normal. No respiratory distress. He has no wheezes.   Abdominal: Soft. Bowel sounds are normal. There is no abdominal tenderness.   Musculoskeletal: No tenderness.   Neurological: He is alert and oriented to person, place, and time.   Skin: Skin is warm and dry. No rash noted. He is not diaphoretic. No erythema. "   Psychiatric: His behavior is normal. Judgment and thought content normal.       Labs and Imaging   Lab Results   Component Value Date    HGBA1C 8.4 09/07/2021    HGBA1C 6.2 05/26/2021    HGBA1C 8.5 02/24/2021     Office Visit on 09/07/2021   Component Date Value Ref Range Status   • Glucose 09/07/2021 190* 70 - 130 mg/dL Final   • Hemoglobin A1C 09/07/2021 8.4  % Final     US Thyroid (05/20/2020 16:52)      Assessment / Plan   Diagnoses and all orders for this visit:    1. Uncontrolled type 2 diabetes mellitus with hyperglycemia (CMS/[facility]) (Primary)  -     POC Glucose, Blood  -     POC Glycosylated Hemoglobin (Hb A1C)  -     Microalbumin / Creatinine Urine Ratio - Urine, Clean Catch  -     Comprehensive Metabolic Panel  -     Lipid Panel  -     empagliflozin (Jardiance) 25 MG tablet tablet; Take 1 tablet by mouth Daily.  Dispense: 90 tablet; Refill: 1  -     glimepiride (Amaryl) 4 MG tablet; Take 4mg before breakfast and dinner  Dispense: 180 tablet; Refill: 1  -     metFORMIN (GLUCOPHAGE) 1000 MG tablet; Take 1 tablet by mouth 2 (Two) Times a Day.  Dispense: 180 tablet; Refill: 1    2. Right thyroid nodule  -     TSH    3. Mixed hyperlipidemia  -     Comprehensive Metabolic Panel  -     Lipid Panel  -     simvastatin (ZOCOR) 40 MG tablet; Take 1 tablet by mouth Every Night.  Dispense: 90 tablet; Refill: 1    4. Essential hypertension  -     Comprehensive Metabolic Panel  -     lisinopril (PRINIVIL,ZESTRIL) 5 MG tablet; Take 1 tablet by mouth Daily.  Dispense: 90 tablet; Refill: 1        Diabetes Mellitus 2 is under poor control.  -with peripheral neuropathy  -A1c 8.4. up from 6.2 last visit  -he will work on diet again. He has gotten a1c <7 multiple times in the past.   -continue glimepiride 4mg BID AC  -continue trulicity 1.5mg sc weekly  -continue Jardiance 25mg QD  -continue metformin 1000mg BID  -eye exam is scheduled for next week  -fasting labs today  -food exam was updated 2/2021      1.  Diet: 3-4 carb  servings per meal for females, 4-5 carb servings per meal for males  Spread carb intake throughout the day  Increase lean protein and vegetable intake  Avoid sugary drinks and processed carbs including crackers, cookies, cakes  2.  Exercise: Recommend at least 30 minutes of exercise daily, at least 5 days per week. Increase exercise gradually.   3.  Blood Glucose Goal: Blood glucose goal <150 fasting, <180 2 hr postprandial  4.  Microalbumin due 8/2021  5.  Education performed during this visit: long term diabetic complications discussed. , annual eye examinations at Ophthalmology discussed, dental hygiene discussed  and foot care reviewed., home glucose monitoring emphasized, all medications, side effects and compliance discussed carefully and Hypoglycemia management and prevention reviewed. Reviewed ‘ABCs’ of diabetes management (respective goals in parentheses):  A1C (<7), blood pressure (<130/80), and cholesterol (LDL <100, if CVD <70).    Thyroid nodule  -2.1 cm well-circumscribed isoechoic nodule in the right lobe  -pt cancelled appt with Dr. Marcial 9/2/2020 which was for repeat thyroid u/s and possible bx, it was rescheduled for 3/2/2021 and pt was no show for that appt  -repeat thyroid u/s was ordered 5/2021 though it was not scheduled   -I discussed importance of re-evaluating his nodule due to its size. We discussed while majority of thyroid nodules are benign, thyroid cancer is found in some thyroid nodules. He does have another f/u scheduled with Dr. Marcial 9/17/21 - discussed keeping this appt    Hyperlipidemia  -continue zocor  -fasting labs today    HTN  -controlled  -refill lisinopril 5 mg daily    There are no Patient Instructions on file for this visit.    Follow up: Return in about 4 months (around 1/7/2022).    Discussed the nature of the disease including, risks, complications, implications, management, safe and proper use of medications. Encouraged therapeutic lifestyle changes including low  calorie diet with plenty of fruits and vegetables, daily exercise, medication compliance, and keeping scheduled follow up appointments with me and any other providers. Encouraged patient to have appointment for complete physical, fasting labs, appropriate screenings, and immunizations on an annual basis.      Signed: Myron Bourne PA-C

## 2021-09-17 ENCOUNTER — OFFICE VISIT (OUTPATIENT)
Dept: ENDOCRINOLOGY | Facility: CLINIC | Age: 41
End: 2021-09-17

## 2021-09-17 VITALS
WEIGHT: 225.4 LBS | OXYGEN SATURATION: 98 % | DIASTOLIC BLOOD PRESSURE: 64 MMHG | HEART RATE: 90 BPM | BODY MASS INDEX: 28.93 KG/M2 | HEIGHT: 74 IN | SYSTOLIC BLOOD PRESSURE: 110 MMHG

## 2021-09-17 DIAGNOSIS — E04.1 RIGHT THYROID NODULE: Primary | ICD-10-CM

## 2021-09-17 PROCEDURE — 76536 US EXAM OF HEAD AND NECK: CPT | Performed by: INTERNAL MEDICINE

## 2021-09-17 PROCEDURE — 99244 OFF/OP CNSLTJ NEW/EST MOD 40: CPT | Performed by: INTERNAL MEDICINE

## 2021-09-17 PROCEDURE — 10005 FNA BX W/US GDN 1ST LES: CPT | Performed by: INTERNAL MEDICINE

## 2021-09-17 NOTE — PROGRESS NOTES
"Thyroid nodule (Follow up & U/S)    Subjective   Flaco Flores is a 40 y.o. male. he is being seen for consultation today at the request of Myron Delacruz for evaluation of   Thyroid nodule dx in 2020. He is seeing Myron for the diabetes management and thyroid nodule was palpated during exam in 2020.  Patient was referred for thyroid ultrasound 5/8/2020.  Solitary right thyroid nodule was identified and measured approximately 2.1 cm in size.  Patient was referred to see me.    He denies having any symptoms of difficulty swallowing or other complaints.  He denies history of radiation exposure and no family history of thyroid cancer.  Several family members have history of hypothyroidism.  His thyroid function was normal on recent labs.  Lab Results   Component Value Date    TSH 1.720 09/07/2021       Medications:    Current Outpatient Medications:   •  Dulaglutide (Trulicity) 1.5 MG/0.5ML solution pen-injector, Inject 1.5 mg under the skin into the appropriate area as directed 1 (One) Time Per Week., Disp: 6 mL, Rfl: 1  •  empagliflozin (Jardiance) 25 MG tablet tablet, Take 1 tablet by mouth Daily., Disp: 90 tablet, Rfl: 1  •  FLUoxetine (PROzac) 40 MG capsule, Take 40 mg by mouth Daily., Disp: , Rfl:   •  glimepiride (Amaryl) 4 MG tablet, Take 4mg before breakfast and dinner, Disp: 180 tablet, Rfl: 1  •  lisinopril (PRINIVIL,ZESTRIL) 5 MG tablet, Take 1 tablet by mouth Daily., Disp: 90 tablet, Rfl: 1  •  metFORMIN (GLUCOPHAGE) 1000 MG tablet, Take 1 tablet by mouth 2 (Two) Times a Day., Disp: 180 tablet, Rfl: 1  •  omeprazole (priLOSEC) 20 MG capsule, Take 20 mg by mouth Daily., Disp: , Rfl:   •  simvastatin (ZOCOR) 40 MG tablet, Take 1 tablet by mouth Every Night., Disp: 90 tablet, Rfl: 1      Review of Systems   All other systems reviewed and are negative.      Objective   Blood pressure 110/64, pulse 90, height 188 cm (74\"), weight 102 kg (225 lb 6.4 oz), SpO2 98 %.   Physical Exam  Vitals reviewed. "   Constitutional:       Appearance: He is obese.   Neck:      Thyroid: Thyromegaly (Right thyroid nodule palpated 2 cm in size) present.   Cardiovascular:      Rate and Rhythm: Normal rate and regular rhythm.      Pulses: Normal pulses.      Heart sounds: Normal heart sounds.   Pulmonary:      Effort: Pulmonary effort is normal.      Breath sounds: Normal breath sounds.   Musculoskeletal:         General: No swelling.   Neurological:      Mental Status: He is alert and oriented to person, place, and time.   Psychiatric:         Mood and Affect: Mood normal.         Thought Content: Thought content normal.           Results for orders placed or performed in visit on 09/07/21   Microalbumin / Creatinine Urine Ratio - Urine, Clean Catch    Specimen: Urine, Clean Catch   Result Value Ref Range    Microalbumin/Creatinine Ratio      Creatinine, Urine 82.8 mg/dL    Microalbumin, Urine <1.2 mg/dL   Comprehensive Metabolic Panel    Specimen: Blood   Result Value Ref Range    Glucose 201 (H) 65 - 99 mg/dL    BUN 13 6 - 20 mg/dL    Creatinine 0.91 0.76 - 1.27 mg/dL    Sodium 137 136 - 145 mmol/L    Potassium 4.1 3.5 - 5.2 mmol/L    Chloride 101 98 - 107 mmol/L    CO2 24.4 22.0 - 29.0 mmol/L    Calcium 9.3 8.6 - 10.5 mg/dL    Total Protein 7.1 6.0 - 8.5 g/dL    Albumin 4.30 3.50 - 5.20 g/dL    ALT (SGPT) 22 1 - 41 U/L    AST (SGOT) 21 1 - 40 U/L    Alkaline Phosphatase 79 39 - 117 U/L    Total Bilirubin 0.4 0.0 - 1.2 mg/dL    eGFR Non African Amer 92 >60 mL/min/1.73    Globulin 2.8 gm/dL    A/G Ratio 1.5 g/dL    BUN/Creatinine Ratio 14.3 7.0 - 25.0    Anion Gap 11.6 5.0 - 15.0 mmol/L   Lipid Panel    Specimen: Blood   Result Value Ref Range    Total Cholesterol 137 0 - 200 mg/dL    Triglycerides 167 (H) 0 - 150 mg/dL    HDL Cholesterol 35 (L) 40 - 60 mg/dL    LDL Cholesterol  73 0 - 100 mg/dL    VLDL Cholesterol 29 5 - 40 mg/dL    LDL/HDL Ratio 1.96    TSH    Specimen: Blood   Result Value Ref Range    TSH 1.720 0.270 - 4.200  uIU/mL   POC Glucose, Blood    Specimen: Blood   Result Value Ref Range    Glucose 190 (A) 70 - 130 mg/dL   POC Glycosylated Hemoglobin (Hb A1C)    Specimen: Blood   Result Value Ref Range    Hemoglobin A1C 8.4 %             Thyroid ultrasound 09/17/21            Real time high resolution imaging of the thyroid gland was performed in transverse and longitudinal planes.   Previous images were reviewed and compared to the current appearance to assess stability.       The right lobe measured 4.68 cm L x 1.99 cm AP x 2.08 cm in TV dimension.    The isthmus measured 0.18 cm in thickness.    The left thyroid lobe measured 3.77 cm L x 1.63 cm AP x 1.29 cm in TV dimension.    Thyroid gland is homogeneous and contains single nodules.    Nodule 1   is located in the right lower lobe and measures 2.16 x 1.56 x 1.36 cm (L X AP X TV).  This nodule is solid, homogeneous, isoechoic with well-defined margins, halo, and Grade II vascularity on Color Flow Doppler.  It has no artifacts      No pathologic lymph nodes were seen.  There is a slightly enlarged right level 2A lymph node, measuring 0.32 x 0.63    Thyroid Biopsy Procedure Note      Indications: Thyroid Nodule    Anesthesia: ethyl chloride spray    Procedure Details   The procedure, risks and complications have been discussed in detail (including, but not limited to airway compromise, infection, bleeding) with the patient, and the patient has signed consent to the procedure    The neck was prepped in sterile fashion..   Biopsy site: right.  With ultrasound guidance of needle localization,   4  aspirates were obtained with sterile 27 g. Needles.    A single container with 20 ml of cytology fixative was also used to collect needle and syringe washings.   Specimens sent to pathology.   Additional specimen is collected for molecular analysis and will be sent in the event of undetermined biopsy.     The patient tolerated the procedure without difficulty or complications.          Assessment: right dominant thyroid nodule biopsied with u/s guidance.   Repeat ultrasound in 12 months      Assessment/Plan:    Problem List Items Addressed This Visit        Other    Right thyroid nodule - Primary (Chronic)    Relevant Orders    US Thyroid (Completed)          Old records were reviewed and summarized in HPI section of the note.  Previous ultrasound report was reviewed and compared to current finding.   FNA of the right nodule performed today and sent for cytology evaluation.  I will continue monitoring the nodule on a yearly basis given biopsy is benign.  Thyroid function was normal on recent evaluation    Return in about 1 year (around 9/17/2022) for ultrasound.

## 2021-09-28 ENCOUNTER — TELEPHONE (OUTPATIENT)
Dept: ENDOCRINOLOGY | Facility: CLINIC | Age: 41
End: 2021-09-28

## 2021-09-28 NOTE — TELEPHONE ENCOUNTER
----- Message from Sona RODRIGUEZ MD sent at 9/28/2021 12:34 AM EDT -----  Please call with biopsy results - benign thyroid nodule

## 2022-01-17 DIAGNOSIS — E78.2 MIXED HYPERLIPIDEMIA: Chronic | ICD-10-CM

## 2022-01-17 DIAGNOSIS — I10 ESSENTIAL HYPERTENSION: Chronic | ICD-10-CM

## 2022-01-17 DIAGNOSIS — E11.65 UNCONTROLLED TYPE 2 DIABETES MELLITUS WITH HYPERGLYCEMIA: Chronic | ICD-10-CM

## 2022-01-17 RX ORDER — SIMVASTATIN 40 MG
40 TABLET ORAL NIGHTLY
Qty: 90 TABLET | Refills: 3 | Status: SHIPPED | OUTPATIENT
Start: 2022-01-17 | End: 2022-06-14 | Stop reason: SDUPTHER

## 2022-01-17 RX ORDER — DULAGLUTIDE 1.5 MG/.5ML
1.5 INJECTION, SOLUTION SUBCUTANEOUS WEEKLY
Qty: 6 ML | Refills: 3 | Status: SHIPPED | OUTPATIENT
Start: 2022-01-17 | End: 2022-03-30 | Stop reason: SDUPTHER

## 2022-01-17 RX ORDER — GLIMEPIRIDE 4 MG/1
TABLET ORAL
Qty: 180 TABLET | Refills: 3 | Status: SHIPPED | OUTPATIENT
Start: 2022-01-17 | End: 2022-06-14 | Stop reason: SDUPTHER

## 2022-01-17 RX ORDER — LISINOPRIL 5 MG/1
5 TABLET ORAL DAILY
Qty: 90 TABLET | Refills: 3 | Status: SHIPPED | OUTPATIENT
Start: 2022-01-17 | End: 2022-06-14 | Stop reason: SDUPTHER

## 2022-03-29 DIAGNOSIS — E11.65 UNCONTROLLED TYPE 2 DIABETES MELLITUS WITH HYPERGLYCEMIA: Chronic | ICD-10-CM

## 2022-03-29 RX ORDER — DULAGLUTIDE 1.5 MG/.5ML
1.5 INJECTION, SOLUTION SUBCUTANEOUS WEEKLY
Qty: 6 ML | Refills: 3 | OUTPATIENT
Start: 2022-03-29

## 2022-03-30 DIAGNOSIS — E11.65 UNCONTROLLED TYPE 2 DIABETES MELLITUS WITH HYPERGLYCEMIA: Chronic | ICD-10-CM

## 2022-03-30 RX ORDER — DULAGLUTIDE 1.5 MG/.5ML
1.5 INJECTION, SOLUTION SUBCUTANEOUS WEEKLY
Qty: 6 ML | Refills: 0 | OUTPATIENT
Start: 2022-03-30

## 2022-03-30 RX ORDER — DULAGLUTIDE 1.5 MG/.5ML
1.5 INJECTION, SOLUTION SUBCUTANEOUS WEEKLY
Qty: 6 ML | Refills: 0 | Status: SHIPPED | OUTPATIENT
Start: 2022-03-30 | End: 2022-06-01 | Stop reason: SDUPTHER

## 2022-04-04 ENCOUNTER — OFFICE VISIT (OUTPATIENT)
Dept: ENDOCRINOLOGY | Facility: CLINIC | Age: 42
End: 2022-04-04

## 2022-04-04 ENCOUNTER — LAB (OUTPATIENT)
Dept: LAB | Facility: HOSPITAL | Age: 42
End: 2022-04-04

## 2022-04-04 VITALS
WEIGHT: 215.8 LBS | OXYGEN SATURATION: 96 % | DIASTOLIC BLOOD PRESSURE: 68 MMHG | SYSTOLIC BLOOD PRESSURE: 110 MMHG | BODY MASS INDEX: 27.7 KG/M2 | HEART RATE: 101 BPM | HEIGHT: 74 IN

## 2022-04-04 DIAGNOSIS — I10 PRIMARY HYPERTENSION: Chronic | ICD-10-CM

## 2022-04-04 DIAGNOSIS — E78.2 MIXED HYPERLIPIDEMIA: Chronic | ICD-10-CM

## 2022-04-04 DIAGNOSIS — E04.1 RIGHT THYROID NODULE: Chronic | ICD-10-CM

## 2022-04-04 DIAGNOSIS — E11.65 UNCONTROLLED TYPE 2 DIABETES MELLITUS WITH HYPERGLYCEMIA: Primary | Chronic | ICD-10-CM

## 2022-04-04 LAB
ALBUMIN SERPL-MCNC: 4.6 G/DL (ref 3.5–5.2)
ALBUMIN/GLOB SERPL: 1.8 G/DL
ALP SERPL-CCNC: 53 U/L (ref 39–117)
ALT SERPL W P-5'-P-CCNC: 18 U/L (ref 1–41)
ANION GAP SERPL CALCULATED.3IONS-SCNC: 12 MMOL/L (ref 5–15)
AST SERPL-CCNC: 23 U/L (ref 1–40)
BILIRUB SERPL-MCNC: 0.6 MG/DL (ref 0–1.2)
BUN SERPL-MCNC: 14 MG/DL (ref 6–20)
BUN/CREAT SERPL: 15.2 (ref 7–25)
CALCIUM SPEC-SCNC: 9.7 MG/DL (ref 8.6–10.5)
CHLORIDE SERPL-SCNC: 101 MMOL/L (ref 98–107)
CHOLEST SERPL-MCNC: 118 MG/DL (ref 0–200)
CO2 SERPL-SCNC: 25 MMOL/L (ref 22–29)
CREAT SERPL-MCNC: 0.92 MG/DL (ref 0.76–1.27)
EGFRCR SERPLBLD CKD-EPI 2021: 106.5 ML/MIN/1.73
EXPIRATION DATE: NORMAL
GLOBULIN UR ELPH-MCNC: 2.5 GM/DL
GLUCOSE BLDC GLUCOMTR-MCNC: 96 MG/DL (ref 70–130)
GLUCOSE SERPL-MCNC: 76 MG/DL (ref 65–99)
HBA1C MFR BLD: 7.3 %
HDLC SERPL-MCNC: 38 MG/DL (ref 40–60)
LDLC SERPL CALC-MCNC: 60 MG/DL (ref 0–100)
LDLC/HDLC SERPL: 1.55 {RATIO}
Lab: NORMAL
POTASSIUM SERPL-SCNC: 4.4 MMOL/L (ref 3.5–5.2)
PROT SERPL-MCNC: 7.1 G/DL (ref 6–8.5)
SODIUM SERPL-SCNC: 138 MMOL/L (ref 136–145)
TRIGL SERPL-MCNC: 106 MG/DL (ref 0–150)
TSH SERPL DL<=0.05 MIU/L-ACNC: 1.85 UIU/ML (ref 0.27–4.2)
VLDLC SERPL-MCNC: 20 MG/DL (ref 5–40)

## 2022-04-04 PROCEDURE — 84443 ASSAY THYROID STIM HORMONE: CPT | Performed by: PHYSICIAN ASSISTANT

## 2022-04-04 PROCEDURE — 80053 COMPREHEN METABOLIC PANEL: CPT | Performed by: PHYSICIAN ASSISTANT

## 2022-04-04 PROCEDURE — 80061 LIPID PANEL: CPT | Performed by: PHYSICIAN ASSISTANT

## 2022-04-04 PROCEDURE — 99214 OFFICE O/P EST MOD 30 MIN: CPT | Performed by: PHYSICIAN ASSISTANT

## 2022-04-04 PROCEDURE — 82947 ASSAY GLUCOSE BLOOD QUANT: CPT | Performed by: PHYSICIAN ASSISTANT

## 2022-04-04 PROCEDURE — 83036 HEMOGLOBIN GLYCOSYLATED A1C: CPT | Performed by: PHYSICIAN ASSISTANT

## 2022-04-04 NOTE — PROGRESS NOTES
Chief Complaint  F/u for Diabetes Mellitus.     HPI   Flaco lFores is a 41 y.o. male who is here today for f/u of Diabetes Mellitus type 2. The initial diagnosis of diabetes was made 2015.     A1C- 7.3 (4/4/2022), 8.4 (9/7/2021), 6.2 (5/26/2021), 8 (11/11/2020), 7.4 (8/11/2020), 7.8 (5/8/2020), 8.5 (12/13/19)    Diabetic complications: mild peripheral neuropathy  Eye exam current (within one year): updated 12/2021  Foot care and dental care: discussed    Current diabetic medications include:  Metformin 1000mg BID  Jardiance 25mg daily  Glimepiride 4mg BID AC  Trulicity 1.5mg sc weekly - tolerating well     Statin: zocor 40    Past medications: Januvia (insurance did not cover), tradjenta    Diabetic Monitoring  - no meter or log for review    The following portions of the patient's history were reviewed and updated by me as appropriate: allergies, current medications, past family history, past social history, past surgical history and problem list.      Past Medical History:   Diagnosis Date   • Acid reflux    • Chipped tooth     has several in back of mouth   • Depression    • Diabetes (HCC)    • Diarrhea     medicine induced   • Erectile dysfunction    • History of being tatooed     x1   • Wears glasses        Medications    Current Outpatient Medications:   •  Dulaglutide (Trulicity) 1.5 MG/0.5ML solution pen-injector, Inject 1.5 mg under the skin into the appropriate area as directed 1 (One) Time Per Week., Disp: 6 mL, Rfl: 0  •  empagliflozin (Jardiance) 25 MG tablet tablet, Take 1 tablet by mouth Daily., Disp: 90 tablet, Rfl: 3  •  FLUoxetine (PROzac) 40 MG capsule, Take 40 mg by mouth Daily., Disp: , Rfl:   •  glimepiride (Amaryl) 4 MG tablet, Take 4mg before breakfast and dinner, Disp: 180 tablet, Rfl: 3  •  lisinopril (PRINIVIL,ZESTRIL) 5 MG tablet, Take 1 tablet by mouth Daily., Disp: 90 tablet, Rfl: 3  •  metFORMIN (GLUCOPHAGE) 1000 MG tablet, Take 1 tablet by mouth 2 (Two) Times a Day., Disp:  "180 tablet, Rfl: 3  •  omeprazole (priLOSEC) 20 MG capsule, Take 20 mg by mouth Daily., Disp: , Rfl:   •  simvastatin (ZOCOR) 40 MG tablet, Take 1 tablet by mouth Every Night., Disp: 90 tablet, Rfl: 3    Review of Systems  Review of Systems   Constitutional: Negative for fatigue.   Cardiovascular: Negative for chest pain.   Endocrine: Negative for polydipsia, polyphagia and polyuria.   Skin: Negative for pallor.   Neurological: Positive for dizziness and headaches. Negative for tremors, seizures, speech difficulty and weakness.   Psychiatric/Behavioral: Negative for confusion. The patient is not nervous/anxious.         Physical Exam    /68   Pulse 101   Ht 188 cm (74\")   Wt 97.9 kg (215 lb 12.8 oz)   SpO2 96%   BMI 27.71 kg/m² Body mass index is 27.71 kg/m².  Physical Exam   Constitutional: He is oriented to person, place, and time. He appears well-developed. No distress.   HENT:   Head: Normocephalic.   Right Ear: External ear normal.   Left Ear: External ear normal.   Nose: Nose normal.   Eyes: Conjunctivae are normal. Right eye exhibits no discharge. Left eye exhibits no discharge. No scleral icterus.   Neck: No JVD present. No tracheal deviation present. No thyromegaly present.   Cardiovascular: Normal rate, regular rhythm and normal heart sounds.   No murmur heard.  Pulmonary/Chest: Effort normal and breath sounds normal. No respiratory distress. He has no wheezes.   Abdominal: Soft. Bowel sounds are normal. There is no abdominal tenderness.   Musculoskeletal: No tenderness.   Neurological: He is alert and oriented to person, place, and time.   Skin: Skin is warm and dry. No rash noted. He is not diaphoretic. No erythema.   Psychiatric: His behavior is normal. Judgment and thought content normal.       Labs and Imaging   Lab Results   Component Value Date    HGBA1C 7.3 04/04/2022    HGBA1C 8.4 09/07/2021    HGBA1C 6.2 05/26/2021     Office Visit on 04/04/2022   Component Date Value Ref Range Status "   • Glucose 04/04/2022 96  70 - 130 mg/dL Final   • Hemoglobin A1C 04/04/2022 7.3  % Final   • Lot Number 04/04/2022 [id number]   Final   • Expiration Date 04/04/2022 [id number]   Final       Assessment / Plan   Diagnoses and all orders for this visit:    1. Uncontrolled type 2 diabetes mellitus with hyperglycemia (HCC) (Primary)  -     POC Glucose, Blood  -     POC Glycosylated Hemoglobin (Hb A1C)  -     Comprehensive Metabolic Panel  -     Lipid Panel    2. Right thyroid nodule  -     TSH    3. Mixed hyperlipidemia  -     Comprehensive Metabolic Panel  -     Lipid Panel    4. Primary hypertension        Diabetes Mellitus 2 is under improved control.  -with peripheral neuropathy  -A1c 7.3, down from 8.4 last visit  -continue to work on diet  -continue glimepiride 4mg BID AC  -continue trulicity 1.5mg sc weekly  -continue Jardiance 25mg QD  -continue metformin 1000mg BID  -eye exam updated 12/2021  -labs today  -food exam due - defer until next visit per pt request      1.  Diet: 3-4 carb servings per meal for females, 4-5 carb servings per meal for males  Spread carb intake throughout the day  Increase lean protein and vegetable intake  Avoid sugary drinks and processed carbs including crackers, cookies, cakes  2.  Exercise: Recommend at least 30 minutes of exercise daily, at least 5 days per week. Increase exercise gradually.   3.  Blood Glucose Goal: Blood glucose goal <150 fasting, <180 2 hr postprandial  4.  Microalbumin due 9/2022  5.  Education performed during this visit: long term diabetic complications discussed. , annual eye examinations at Ophthalmology discussed, dental hygiene discussed  and foot care reviewed., home glucose monitoring emphasized, all medications, side effects and compliance discussed carefully and Hypoglycemia management and prevention reviewed. Reviewed ‘ABCs’ of diabetes management (respective goals in parentheses):  A1C (<7), blood pressure (<130/80), and cholesterol (LDL <100,  if CVD <70).    Thyroid nodule  -right thyroid nodule - bx by Dr. Marcial 9/2021, benign  -Dr. Marcial will follow yearly     Hyperlipidemia  -continue zocor  -labs today    HTN  -controlled  -continue lisinopril 5 mg daily    There are no Patient Instructions on file for this visit.    Follow up: Return in about 3 months (around 7/4/2022).    Discussed the nature of the disease including, risks, complications, implications, management, safe and proper use of medications. Encouraged therapeutic lifestyle changes including low calorie diet with plenty of fruits and vegetables, daily exercise, medication compliance, and keeping scheduled follow up appointments with me and any other providers. Encouraged patient to have appointment for complete physical, fasting labs, appropriate screenings, and immunizations on an annual basis.      Signed: Myron Bourne PA-C

## 2022-04-11 PROBLEM — E11.3393 MODERATE NONPROLIFERATIVE DIABETIC RETINOPATHY OF BOTH EYES WITHOUT MACULAR EDEMA ASSOCIATED WITH TYPE 2 DIABETES MELLITUS: Status: ACTIVE | Noted: 2022-04-11

## 2022-06-01 DIAGNOSIS — E11.65 UNCONTROLLED TYPE 2 DIABETES MELLITUS WITH HYPERGLYCEMIA: Chronic | ICD-10-CM

## 2022-06-02 RX ORDER — DULAGLUTIDE 1.5 MG/.5ML
1.5 INJECTION, SOLUTION SUBCUTANEOUS WEEKLY
Qty: 6 ML | Refills: 1 | Status: SHIPPED | OUTPATIENT
Start: 2022-06-02 | End: 2022-10-31 | Stop reason: DRUGHIGH

## 2022-06-14 DIAGNOSIS — E11.65 UNCONTROLLED TYPE 2 DIABETES MELLITUS WITH HYPERGLYCEMIA: Chronic | ICD-10-CM

## 2022-06-14 DIAGNOSIS — E78.2 MIXED HYPERLIPIDEMIA: Chronic | ICD-10-CM

## 2022-06-14 DIAGNOSIS — I10 ESSENTIAL HYPERTENSION: Chronic | ICD-10-CM

## 2022-06-14 RX ORDER — GLIMEPIRIDE 4 MG/1
TABLET ORAL
Qty: 180 TABLET | Refills: 1 | Status: SHIPPED | OUTPATIENT
Start: 2022-06-14 | End: 2022-10-31 | Stop reason: SDUPTHER

## 2022-06-14 RX ORDER — SIMVASTATIN 40 MG
40 TABLET ORAL NIGHTLY
Qty: 90 TABLET | Refills: 1 | Status: SHIPPED | OUTPATIENT
Start: 2022-06-14 | End: 2023-02-20 | Stop reason: SDUPTHER

## 2022-06-14 RX ORDER — LISINOPRIL 5 MG/1
5 TABLET ORAL DAILY
Qty: 90 TABLET | Refills: 1 | Status: SHIPPED | OUTPATIENT
Start: 2022-06-14 | End: 2023-02-20 | Stop reason: SDUPTHER

## 2022-07-18 DIAGNOSIS — E11.65 UNCONTROLLED TYPE 2 DIABETES MELLITUS WITH HYPERGLYCEMIA: Chronic | ICD-10-CM

## 2022-07-18 RX ORDER — DULAGLUTIDE 1.5 MG/.5ML
1.5 INJECTION, SOLUTION SUBCUTANEOUS WEEKLY
Qty: 6 ML | Refills: 1 | OUTPATIENT
Start: 2022-07-18

## 2022-07-18 NOTE — TELEPHONE ENCOUNTER
Refill sent for 6 month supply on 6/2/2022 to [facility]. Should not need refilled already. LALO

## 2022-09-16 ENCOUNTER — OFFICE VISIT (OUTPATIENT)
Dept: ENDOCRINOLOGY | Facility: CLINIC | Age: 42
End: 2022-09-16

## 2022-09-16 VITALS
OXYGEN SATURATION: 98 % | HEIGHT: 74 IN | WEIGHT: 209.3 LBS | HEART RATE: 99 BPM | DIASTOLIC BLOOD PRESSURE: 72 MMHG | BODY MASS INDEX: 26.86 KG/M2 | SYSTOLIC BLOOD PRESSURE: 116 MMHG

## 2022-09-16 DIAGNOSIS — E04.1 RIGHT THYROID NODULE: Primary | ICD-10-CM

## 2022-09-16 DIAGNOSIS — E11.65 UNCONTROLLED TYPE 2 DIABETES MELLITUS WITH HYPERGLYCEMIA: ICD-10-CM

## 2022-09-16 LAB
EXPIRATION DATE: ABNORMAL
EXPIRATION DATE: NORMAL
GLUCOSE BLDC GLUCOMTR-MCNC: 175 MG/DL (ref 70–130)
HBA1C MFR BLD: 11.3 %
Lab: ABNORMAL
Lab: NORMAL

## 2022-09-16 PROCEDURE — 99214 OFFICE O/P EST MOD 30 MIN: CPT | Performed by: INTERNAL MEDICINE

## 2022-09-16 PROCEDURE — 83036 HEMOGLOBIN GLYCOSYLATED A1C: CPT | Performed by: INTERNAL MEDICINE

## 2022-09-16 PROCEDURE — 82947 ASSAY GLUCOSE BLOOD QUANT: CPT | Performed by: INTERNAL MEDICINE

## 2022-09-16 PROCEDURE — 76536 US EXAM OF HEAD AND NECK: CPT | Performed by: INTERNAL MEDICINE

## 2022-09-16 RX ORDER — DULAGLUTIDE 3 MG/.5ML
3 INJECTION, SOLUTION SUBCUTANEOUS WEEKLY
Qty: 2 ML | Refills: 6 | Status: SHIPPED | OUTPATIENT
Start: 2022-09-16 | End: 2022-10-31 | Stop reason: SDUPTHER

## 2022-09-16 NOTE — PROGRESS NOTES
THyroid Noudle and Diabetes (Follow Up & U/S)    Subjective   Flaco Flores is a 41 y.o. male. he is being seen for follow-up  Thyroid nodule dx in 2020. He is seeing [facility] for the diabetes management and thyroid nodule was palpated during exam in 2020.   Solitary right thyroid nodule was identified n u/s and measured approximately 2.1 cm in size.  FNA 09/2021 showed benign nodule.   His thyroid function was normal on recent labs.  Lab Results   Component Value Date    TSH 1.850 04/04/2022     Diabetes mellitus type 2 - poorly controlled with A1C of 11.2 today. Last visit was in 4/2022.   Patient reported that he had COVID and glucose was high. He stopped all his medications and stopped following the diet.     Current diabetic medications include:  Metformin 1000mg BID  Jardiance 25mg daily  Glimepiride 4mg BID AC  Trulicity 1.5mg sc weekly - tolerating well      Statin: zocor 40    Medications:    Current Outpatient Medications:   •  Dulaglutide (Trulicity) 1.5 MG/0.5ML solution pen-injector, Inject 1.5 mg under the skin into the appropriate area as directed 1 (One) Time Per Week., Disp: 6 mL, Rfl: 1  •  empagliflozin (Jardiance) 25 MG tablet tablet, Take 1 tablet by mouth Daily., Disp: 90 tablet, Rfl: 1  •  glimepiride (Amaryl) 4 MG tablet, Take 4mg before breakfast and dinner, Disp: 180 tablet, Rfl: 1  •  lisinopril (PRINIVIL,ZESTRIL) 5 MG tablet, Take 1 tablet by mouth Daily., Disp: 90 tablet, Rfl: 1  •  metFORMIN (GLUCOPHAGE) 1000 MG tablet, Take 1 tablet by mouth 2 (Two) Times a Day., Disp: 180 tablet, Rfl: 1  •  omeprazole (priLOSEC) 20 MG capsule, Take 20 mg by mouth Daily., Disp: , Rfl:   •  simvastatin (ZOCOR) 40 MG tablet, Take 1 tablet by mouth Every Night., Disp: 90 tablet, Rfl: 1  •  Dulaglutide (Trulicity) 3 MG/0.5ML solution pen-injector, Inject 0.5 mL under the skin into the appropriate area as directed 1 (One) Time Per Week., Disp: 2 mL, Rfl: 6  •  FLUoxetine (PROzac) 40 MG capsule, Take  "40 mg by mouth Daily., Disp: , Rfl:       Review of Systems   All other systems reviewed and are negative.      Objective   Blood pressure 116/72, pulse 99, height 188 cm (74\"), weight 94.9 kg (209 lb 4.8 oz), SpO2 98 %.   Physical Exam  Vitals reviewed.   Constitutional:       Appearance: He is obese.   Neck:      Thyroid: Thyromegaly (Right thyroid nodule palpated 2 cm in size) present.   Cardiovascular:      Rate and Rhythm: Normal rate and regular rhythm.      Pulses: Normal pulses.      Heart sounds: Normal heart sounds.   Pulmonary:      Effort: Pulmonary effort is normal.      Breath sounds: Normal breath sounds.   Musculoskeletal:         General: No swelling.   Neurological:      Mental Status: He is alert and oriented to person, place, and time.   Psychiatric:         Mood and Affect: Mood normal.         Thought Content: Thought content normal.           Results for orders placed or performed in visit on 09/16/22   POC Glycosylated Hemoglobin (Hb A1C)    Specimen: Blood   Result Value Ref Range    Hemoglobin A1C 11.3 %    Lot Number [id number]     Expiration Date 06/14/2024    POC Glucose, Blood    Specimen: Blood   Result Value Ref Range    Glucose 175 (A) 70 - 130 mg/dL    Lot Number [id number]     Expiration Date 03/28/2023              Thyroid ultrasound 09/16/22            Real time high resolution imaging of the thyroid gland was performed in transverse and longitudinal planes.   Previous images were reviewed and compared to the current appearance to assess stability.       The right lobe measured 4.73 cm L x 2.4  cm AP x 1.67 cm in TV dimension.    The isthmus measured 0.39 cm in thickness.    The left thyroid lobe measured 4.45 cm L x 1.79 cm AP x 1.24 cm in TV dimension.    Thyroid gland is homogeneous and contains single nodules.    Nodule 1   is located in the right lower lobe and measures 2.16 x 1.56 x 1.36 cm (L X AP X TV).  This nodule is solid, homogeneous, isoechoic with well-defined " margins, halo, and Grade II vascularity on Color Flow Doppler.  It has no artifacts      No pathologic lymph nodes were seen.  There is a slightly enlarged right level 2A lymph node, measuring 0.32 x 0.63 cm    Assessment: right dominant thyroid nodule    Repeat ultrasound in 12 months      Assessment/Plan:    Problem List Items Addressed This Visit        Other    Uncontrolled type 2 diabetes mellitus with hyperglycemia (HCC) (Chronic)    Relevant Medications    Dulaglutide (Trulicity) 3 MG/0.5ML solution pen-injector    Other Relevant Orders    POC Glycosylated Hemoglobin (Hb A1C) (Completed)    POC Glucose, Blood (Completed)    Right thyroid nodule - Primary (Chronic)    Overview     FNA 09/17/21 showed benign nodule.          Relevant Orders    US Thyroid (Completed)          Old records were reviewed and summarized in HPI section of the note.  Previous ultrasound report was reviewed and compared to current finding.   I will continue monitoring the nodule on a yearly basis given biopsy is benign.  Thyroid function was normal on recent evaluation    Diabetes mellitus type 2 is poorly controlled with A1C of 11.2. He stopped all medications and didn't follow the diet for a month. Now he is back to meds and follows the diet. He is confident that he will be able to improve the glucose in 1 month. Would like to stay with same meds.   -cont same medications and increase Trulicity to 3 mg weekly       Return in about 1 year (around 9/16/2023) for ultrasound.

## 2022-09-19 DIAGNOSIS — E11.65 UNCONTROLLED TYPE 2 DIABETES MELLITUS WITH HYPERGLYCEMIA: Chronic | ICD-10-CM

## 2022-09-19 DIAGNOSIS — E78.2 MIXED HYPERLIPIDEMIA: Chronic | ICD-10-CM

## 2022-09-19 DIAGNOSIS — I10 ESSENTIAL HYPERTENSION: Chronic | ICD-10-CM

## 2022-09-19 RX ORDER — DULAGLUTIDE 3 MG/.5ML
3 INJECTION, SOLUTION SUBCUTANEOUS WEEKLY
Qty: 2 ML | Refills: 6 | OUTPATIENT
Start: 2022-09-19

## 2022-09-19 RX ORDER — LISINOPRIL 5 MG/1
5 TABLET ORAL DAILY
Qty: 90 TABLET | Refills: 1 | OUTPATIENT
Start: 2022-09-19

## 2022-09-19 RX ORDER — SIMVASTATIN 40 MG
40 TABLET ORAL NIGHTLY
Qty: 90 TABLET | Refills: 1 | OUTPATIENT
Start: 2022-09-19

## 2022-09-19 RX ORDER — GLIMEPIRIDE 4 MG/1
TABLET ORAL
Qty: 180 TABLET | Refills: 1 | OUTPATIENT
Start: 2022-09-19

## 2022-09-19 NOTE — TELEPHONE ENCOUNTER
Duplicate request RX E_scripted  Dulaglutide (Trulicity) 3 MG/0.5ML solution pen-injector [[id number]]     Order Details  Dose: 3 mg Route: Subcutaneous Frequency: Weekly   Dispense Quantity: 2 mL Refills: 6          Sig: Inject 0.5 mL under the skin into the appropriate area as directed 1 (One) Time Per Week.         Start Date: 09/16/22 End Date: --   Written Date: 09/16/22 Expiration Date: 09/16/23     Providers    Ordering Provider and Authorizing Provider:    Sona Marcial MD   [address] [facility] [address]   Phone:  [phone]   Fax:  [phone]   NPI:  [id number]        Ordering User:  Sona Marcial MD          [address] - [phone]  - [phone]    [address] [facility] [address]   Phone:  [phone]  Fax:  [phone]           Orders with any of the following pharmaceutical classes: Antidiabetic    Name Dose Frequency Start Date End Date Medication Warnings Interventions? Order Mode    Dulaglutide (Trulicity) 1.5 MG/0.5ML solution pen-injector 1.5 mg Weekly 06/02/22    Outpatient    metFORMIN (GLUCOPHAGE) 1000 MG tablet 1,000 mg 2 Times Daily 06/14/22    Outpatient    glimepiride (Amaryl) 4 MG tablet   06/14/22    Outpatient    empagliflozin (Jardiance) 25 MG tablet tablet 25 mg Daily 06/14/22    Outpatient                        Warnings History    Total number of overridden warnings: 1   Full Warnings History             Pharmacist Clinical Review History    This prescription has not been clinically reviewed.       Order Reconciliation Actions       Order Reconciliation Actions                 E-Prescribing Status      Outpatient Medication Detail    Dulaglutide (Trulicity) 3 MG/0.5ML solution pen-injector        Sig: Inject 0.5 mL under the skin into the appropriate area as directed 1 (One) Time Per Week.        Sent to pharmacy as: Trulicity 3 MG/0.5ML Subcutaneous Solution Pen-injector  (Dulaglutide)        Class: Normal        Route: Subcutaneous        E-Prescribing Status: Receipt confirmed by pharmacy (9/16/2022 10:31 AM EDT)

## 2022-10-31 ENCOUNTER — OFFICE VISIT (OUTPATIENT)
Dept: ENDOCRINOLOGY | Facility: CLINIC | Age: 42
End: 2022-10-31

## 2022-10-31 VITALS
DIASTOLIC BLOOD PRESSURE: 66 MMHG | BODY MASS INDEX: 26.44 KG/M2 | WEIGHT: 206 LBS | HEIGHT: 74 IN | SYSTOLIC BLOOD PRESSURE: 116 MMHG | OXYGEN SATURATION: 98 % | HEART RATE: 102 BPM

## 2022-10-31 DIAGNOSIS — E04.1 RIGHT THYROID NODULE: Chronic | ICD-10-CM

## 2022-10-31 DIAGNOSIS — I10 ESSENTIAL HYPERTENSION: Chronic | ICD-10-CM

## 2022-10-31 DIAGNOSIS — E11.65 UNCONTROLLED TYPE 2 DIABETES MELLITUS WITH HYPERGLYCEMIA: Primary | Chronic | ICD-10-CM

## 2022-10-31 DIAGNOSIS — E78.2 MIXED HYPERLIPIDEMIA: Chronic | ICD-10-CM

## 2022-10-31 LAB
EXPIRATION DATE: NORMAL
EXPIRATION DATE: NORMAL
GLUCOSE BLDC GLUCOMTR-MCNC: 119 MG/DL (ref 70–130)
HBA1C MFR BLD: 7 %
Lab: NORMAL
Lab: NORMAL

## 2022-10-31 PROCEDURE — 99214 OFFICE O/P EST MOD 30 MIN: CPT | Performed by: PHYSICIAN ASSISTANT

## 2022-10-31 PROCEDURE — 82947 ASSAY GLUCOSE BLOOD QUANT: CPT | Performed by: PHYSICIAN ASSISTANT

## 2022-10-31 PROCEDURE — 83036 HEMOGLOBIN GLYCOSYLATED A1C: CPT | Performed by: PHYSICIAN ASSISTANT

## 2022-10-31 RX ORDER — GLIMEPIRIDE 4 MG/1
TABLET ORAL
Qty: 180 TABLET | Refills: 1 | Status: SHIPPED | OUTPATIENT
Start: 2022-10-31 | End: 2023-02-20 | Stop reason: SDUPTHER

## 2022-10-31 RX ORDER — DULAGLUTIDE 3 MG/.5ML
3 INJECTION, SOLUTION SUBCUTANEOUS WEEKLY
Qty: 6 ML | Refills: 1 | Status: SHIPPED | OUTPATIENT
Start: 2022-10-31 | End: 2023-02-20 | Stop reason: SDUPTHER

## 2022-10-31 NOTE — PROGRESS NOTES
Patient had an Hemoglobin A1C less than 3 months ago but requested we re-check it today. I advised the patient that he may have to pay out of pocket and he verbally agreed to pay out of pocket.

## 2022-10-31 NOTE — PROGRESS NOTES
Chief Complaint  F/u for Diabetes Mellitus.     HPI   Flaco Flores is a 41 y.o. male who is here today for f/u of Diabetes Mellitus type 2. The initial diagnosis of diabetes was made 2015.     Has been off medications for a month. Back on everything for about 6 weeks.   Trulicity 3 mg causing some nausea.     Diabetic complications: mild peripheral neuropathy  Eye exam current (within one year): updated 12/2021    Current diabetic medications include:  Metformin 1000mg BID  Jardiance 25mg daily  Glimepiride 4mg BID AC  Trulicity 3 mg sc weekly - tolerating well     Statin: zocor 40    Past medications: Januvia (insurance did not cover), tradjenta    Diabetic Monitoring  - no meter or log for review    The following portions of the patient's history were reviewed and updated by me as appropriate: allergies, current medications, past family history, past social history, past surgical history and problem list.      Past Medical History:   Diagnosis Date   • Acid reflux    • Chipped tooth     has several in back of mouth   • Depression    • Diabetes (HCC)    • Diarrhea     medicine induced   • Erectile dysfunction    • History of being tatooed     x1   • Type 2 diabetes mellitus (HCC)    • Wears glasses        Medications    Current Outpatient Medications:   •  Dulaglutide (Trulicity) 3 MG/0.5ML solution pen-injector, Inject 0.5 mL under the skin into the appropriate area as directed 1 (One) Time Per Week., Disp: 6 mL, Rfl: 1  •  empagliflozin (Jardiance) 25 MG tablet tablet, Take 1 tablet by mouth Daily., Disp: 90 tablet, Rfl: 1  •  FLUoxetine (PROzac) 40 MG capsule, Take 40 mg by mouth Daily., Disp: , Rfl:   •  glimepiride (Amaryl) 4 MG tablet, Take 4mg before breakfast and dinner, Disp: 180 tablet, Rfl: 1  •  lisinopril (PRINIVIL,ZESTRIL) 5 MG tablet, Take 1 tablet by mouth Daily., Disp: 90 tablet, Rfl: 1  •  metFORMIN (GLUCOPHAGE) 1000 MG tablet, Take 1 tablet by mouth 2 (Two) Times a Day., Disp: 180  "tablet, Rfl: 1  •  omeprazole (priLOSEC) 20 MG capsule, Take 20 mg by mouth Daily., Disp: , Rfl:   •  simvastatin (ZOCOR) 40 MG tablet, Take 1 tablet by mouth Every Night., Disp: 90 tablet, Rfl: 1    Review of Systems  Review of Systems   All other systems reviewed and are negative.         Physical Exam    /66   Pulse 102   Ht 188 cm (74\")   Wt 93.4 kg (206 lb)   SpO2 98%   BMI 26.45 kg/m² Body mass index is 26.45 kg/m².     Physical Exam  Constitutional:       General: He is not in acute distress.     Appearance: He is well-developed. He is not diaphoretic.   HENT:      Head: Normocephalic.      Right Ear: External ear normal.      Left Ear: External ear normal.      Nose: Nose normal.   Eyes:      General: No scleral icterus.        Right eye: No discharge.         Left eye: No discharge.      Conjunctiva/sclera: Conjunctivae normal.   Neck:      Thyroid: No thyromegaly.      Vascular: No JVD.      Trachea: No tracheal deviation.   Cardiovascular:      Rate and Rhythm: Normal rate and regular rhythm.      Pulses:           Dorsalis pedis pulses are 2+ on the right side and 2+ on the left side.        Posterior tibial pulses are 2+ on the right side and 2+ on the left side.      Heart sounds: Normal heart sounds. No murmur heard.  Pulmonary:      Effort: Pulmonary effort is normal. No respiratory distress.      Breath sounds: Normal breath sounds. No wheezing.   Musculoskeletal:         General: No tenderness.      Cervical back: Neck supple.      Right foot: No deformity or Charcot foot.      Left foot: No deformity or Charcot foot.   Feet:      Right foot:      Protective Sensation: 5 sites tested. 5 sites sensed.      Skin integrity: No ulcer, blister, skin breakdown, erythema, warmth or callus.      Left foot:      Protective Sensation: 5 sites tested. 5 sites sensed.      Skin integrity: No ulcer, blister, skin breakdown, erythema, warmth or callus.      Comments: Diabetic Foot Exam Performed and " Monofilament Test Performed      Skin:     General: Skin is warm and dry.      Findings: No erythema or rash.   Neurological:      Mental Status: He is alert and oriented to person, place, and time.   Psychiatric:         Behavior: Behavior normal.         Thought Content: Thought content normal.         Judgment: Judgment normal.         Labs and Imaging   Lab Results   Component Value Date    HGBA1C 7.0 10/31/2022    HGBA1C 11.3 09/16/2022    HGBA1C 7.3 04/04/2022     Office Visit on 10/31/2022   Component Date Value Ref Range Status   • Glucose 10/31/2022 119  70 - 130 mg/dL Final   • Lot Number 10/31/2022 [id number]   Final   • Expiration Date 10/31/2022 05-   Final   • Hemoglobin A1C 10/31/2022 7.0  % Final   • Lot Number 10/31/2022 [id number]   Final   • Expiration Date 10/31/2022 07-   Final       Assessment / Plan   Diagnoses and all orders for this visit:    1. Uncontrolled type 2 diabetes mellitus with hyperglycemia (HCC) (Primary)  -     POC Glucose, Blood  -     POC Glycosylated Hemoglobin (Hb A1C)  -     metFORMIN (GLUCOPHAGE) 1000 MG tablet; Take 1 tablet by mouth 2 (Two) Times a Day.  Dispense: 180 tablet; Refill: 1  -     glimepiride (Amaryl) 4 MG tablet; Take 4mg before breakfast and dinner  Dispense: 180 tablet; Refill: 1  -     empagliflozin (Jardiance) 25 MG tablet tablet; Take 1 tablet by mouth Daily.  Dispense: 90 tablet; Refill: 1  -     Dulaglutide (Trulicity) 3 MG/0.5ML solution pen-injector; Inject 0.5 mL under the skin into the appropriate area as directed 1 (One) Time Per Week.  Dispense: 6 mL; Refill: 1    2. Right thyroid nodule    3. Essential hypertension    4. Mixed hyperlipidemia        Diabetes Mellitus 2 is under improved control.  -with peripheral neuropathy  -A1c 7, down from 11.3 9/16/2022  -continue glimepiride 4mg BID AC  -continue trulicity 3 mg sc weekly, he has one 1.5 mg pen and I gave him 2 more sample pens to take 1.5 mg for 3 weeks and then go back up to 3  mg to see if nausea improves  -continue Jardiance 25mg QD  -continue metformin 1000mg BID  -eye exam updated 12/2021  -labs updated 4/2022 and he will have labs done for work in December. Ur albumin due and pt would like to wait until his labs at work are done because they do check a urine  -food exam updated today    Thyroid nodule  -right thyroid nodule - bx by Dr. Marcial 9/2021, benign  -Dr. Marcial following yearly     Hyperlipidemia  -continue zocor  -he will have labs done in Dec and bring to next appt    HTN  -controlled  -continue lisinopril 5 mg daily    There are no Patient Instructions on file for this visit.    Follow up: Return in about 3 months (around 1/31/2023).      Signed: Myron Bourne PA-C

## 2023-02-17 ENCOUNTER — DOCUMENTATION (OUTPATIENT)
Dept: ENDOCRINOLOGY | Facility: CLINIC | Age: 43
End: 2023-02-17
Payer: COMMERCIAL

## 2023-02-17 NOTE — PROGRESS NOTES
Specialty Pharmacy Patient Management Program  Endocrinology Benefits Investigation      Flaco is seen by a [facility] Endocrinology provider and is currently taking a target specialty medication.  The patient IS INELIGIBLE for enrollment in the Endocrinology Patient Management Program offered by [facility] at this time.     Reason: Mail Restriction:      Must use Mail Order- [facility]       Pharmacy Payor: RX [facility]  Pharmacy Plan: [facility]  BIN: [id number]  PCN: [id number]  GROUP: [facility]  
(3) assistive equipment and person

## 2023-02-20 DIAGNOSIS — E11.65 UNCONTROLLED TYPE 2 DIABETES MELLITUS WITH HYPERGLYCEMIA: Chronic | ICD-10-CM

## 2023-02-20 DIAGNOSIS — E78.2 MIXED HYPERLIPIDEMIA: Chronic | ICD-10-CM

## 2023-02-20 DIAGNOSIS — I10 ESSENTIAL HYPERTENSION: Chronic | ICD-10-CM

## 2023-02-20 RX ORDER — GLIMEPIRIDE 4 MG/1
TABLET ORAL
Qty: 180 TABLET | Refills: 0 | Status: SHIPPED | OUTPATIENT
Start: 2023-02-20

## 2023-02-20 RX ORDER — SIMVASTATIN 40 MG
40 TABLET ORAL NIGHTLY
Qty: 90 TABLET | Refills: 0 | Status: SHIPPED | OUTPATIENT
Start: 2023-02-20

## 2023-02-20 RX ORDER — LISINOPRIL 5 MG/1
5 TABLET ORAL DAILY
Qty: 90 TABLET | Refills: 0 | Status: SHIPPED | OUTPATIENT
Start: 2023-02-20

## 2023-02-20 RX ORDER — DULAGLUTIDE 3 MG/.5ML
3 INJECTION, SOLUTION SUBCUTANEOUS WEEKLY
Qty: 6 ML | Refills: 0 | Status: SHIPPED | OUTPATIENT
Start: 2023-02-20

## 2023-02-20 NOTE — TELEPHONE ENCOUNTER
Patient called stated he has moved to [place] and will not be coming back to [place]. He stated he is currently out of all of his meds and needs us to send them to [facility].     He needs Dulaglutide (Trulicity) 3 MG/0.5ML solution pen-injector, empagliflozin (Jardiance) 25 MG tablet tablet, glimepiride (Amaryl) 4 MG tablet, metFORMIN (GLUCOPHAGE) 1000 MG tablet, simvastatin (ZOCOR) 40 MG tablet, lisinopril (PRINIVIL,ZESTRIL) 5 MG tablet.     He also wanted to know if Myron knew of any endocrinologists in the [place] area because he is going to need a referral. Please advise.

## (undated) DEVICE — RICH MAJOR PROCEDURE: Brand: MEDLINE INDUSTRIES, INC.

## (undated) DEVICE — PLUG CATH W/CAP

## (undated) DEVICE — DRN JP FLT NO TROC SIL FUL/PERF 7MM

## (undated) DEVICE — FLEXIBLE YANKAUER,MEDIUM TIP, NO VACUUM CONTROL: Brand: ARGYLE

## (undated) DEVICE — SUT VIC 2/0 SH 27IN

## (undated) DEVICE — CATHETER,FOLEY,100%SILICONE,16FR,10ML,LF: Brand: MEDLINE

## (undated) DEVICE — ATHLETIC SUPPORTER LATEX FREE, MEDIUM

## (undated) DEVICE — PENCL E/S HNDSWCH PUSHBTN HOLSTR 10FT

## (undated) DEVICE — SKIN AFFIX SURG ADHESIVE 72/CS 0.55ML: Brand: MEDLINE

## (undated) DEVICE — BANDAGE,GAUZE,BULKEE II,4.5"X4.1YD,STRL: Brand: MEDLINE

## (undated) DEVICE — 3M™ IOBAN™ 2 ANTIMICROBIAL INCISE DRAPE 6650EZ: Brand: IOBAN™ 2

## (undated) DEVICE — NDL HYPO ECLPS SFTY 25G 1 1/2IN

## (undated) DEVICE — SYR LUER/TIP MONOJECT RGD/PK 60CC STRL

## (undated) DEVICE — RESERVOIR,SUCTION,100CC,SILICONE: Brand: MEDLINE

## (undated) DEVICE — SPNG GZ WOVN 4X4IN 12PLY 10/BX STRL

## (undated) DEVICE — GLV SURG SENSICARE W/ALOE PF LF 7 STRL

## (undated) DEVICE — MARKR SKIN W/RULR

## (undated) DEVICE — SOL NACL 0.9PCT 1000ML

## (undated) DEVICE — T-DRAPE,EXTREMITY,STERILE: Brand: MEDLINE

## (undated) DEVICE — SHEET,DRAPE,70X100,STERILE: Brand: MEDLINE

## (undated) DEVICE — KT ACCSR PENILE AMS700 W/IMP/CONN

## (undated) DEVICE — 1000 S-DRAPE TOWEL DRAPE 10/BX: Brand: STERI-DRAPE™

## (undated) DEVICE — APPL CHLORAPREP W/TINT 26ML ORNG

## (undated) DEVICE — SUT PDS 0 CT2 27IN DYED Z334H

## (undated) DEVICE — ANTIBACTERIAL UNDYED BRAIDED (POLYGLACTIN 910), SYNTHETIC ABSORBABLE SUTURE: Brand: COATED VICRYL

## (undated) DEVICE — SUT PDS 2/0 CT2 27IN Z333H

## (undated) DEVICE — DRAINBAG,ANTI-REFLUX TOWER,L/F,2000ML,LL: Brand: MEDLINE

## (undated) DEVICE — SUP ATHL SUSP BOWER/BLACK PCH/POLY STRP/ELAS M/XLG WHT

## (undated) DEVICE — 3M™ STERI-DRAPE™ FLUOROSCOPE DRAPE, 10 PER CARTON / 4 CARTONS PER CASE, 1012: Brand: STERI-DRAPE™

## (undated) DEVICE — SYR LUERLOK 30CC

## (undated) DEVICE — SUT ETHLN 3/0 FS1 663G

## (undated) DEVICE — GLV SURG SENSICARE PI LF PF 7.5 GRN STRL

## (undated) DEVICE — DECANT BG O JET

## (undated) DEVICE — NDL HYPO ECLPS SFTY 23G 1IN

## (undated) DEVICE — MARKR UTIL W/RULR W/LBL REGTP STRL

## (undated) DEVICE — SYR LL TP 10ML STRL

## (undated) DEVICE — BNDG GZ SOF-FORM CONFRM 3X75IN LF STRL

## (undated) DEVICE — SYR 10ML

## (undated) DEVICE — MAYO STAND COVER: Brand: CONVERTORS

## (undated) DEVICE — CATH URETRL AP 18F

## (undated) DEVICE — SUT VIC 3/0 SH 27IN J416H